# Patient Record
Sex: FEMALE | Race: WHITE | NOT HISPANIC OR LATINO | URBAN - METROPOLITAN AREA
[De-identification: names, ages, dates, MRNs, and addresses within clinical notes are randomized per-mention and may not be internally consistent; named-entity substitution may affect disease eponyms.]

---

## 2017-10-11 ENCOUNTER — INPATIENT (INPATIENT)
Facility: HOSPITAL | Age: 77
LOS: 6 days | Discharge: ANOTHER HOSPITAL | DRG: 682 | End: 2017-10-18
Attending: INTERNAL MEDICINE | Admitting: INTERNAL MEDICINE
Payer: MEDICARE

## 2017-10-11 VITALS
OXYGEN SATURATION: 98 % | DIASTOLIC BLOOD PRESSURE: 71 MMHG | RESPIRATION RATE: 20 BRPM | TEMPERATURE: 98 F | HEART RATE: 90 BPM | HEIGHT: 65 IN | WEIGHT: 160.06 LBS | SYSTOLIC BLOOD PRESSURE: 120 MMHG

## 2017-10-11 DIAGNOSIS — Z95.2 PRESENCE OF PROSTHETIC HEART VALVE: Chronic | ICD-10-CM

## 2017-10-11 DIAGNOSIS — N17.9 ACUTE KIDNEY FAILURE, UNSPECIFIED: ICD-10-CM

## 2017-10-11 DIAGNOSIS — E87.1 HYPO-OSMOLALITY AND HYPONATREMIA: ICD-10-CM

## 2017-10-11 DIAGNOSIS — J44.9 CHRONIC OBSTRUCTIVE PULMONARY DISEASE, UNSPECIFIED: ICD-10-CM

## 2017-10-11 LAB
ACETONE SERPL-MCNC: ABNORMAL
ALBUMIN SERPL ELPH-MCNC: 2.7 G/DL — LOW (ref 3.3–5.2)
ALBUMIN SERPL ELPH-MCNC: 3 G/DL — LOW (ref 3.3–5.2)
ALP SERPL-CCNC: 116 U/L — SIGNIFICANT CHANGE UP (ref 40–120)
ALP SERPL-CCNC: 125 U/L — HIGH (ref 40–120)
ALT FLD-CCNC: 15 U/L — SIGNIFICANT CHANGE UP
ALT FLD-CCNC: 18 U/L — SIGNIFICANT CHANGE UP
ANION GAP SERPL CALC-SCNC: 14 MMOL/L — SIGNIFICANT CHANGE UP (ref 5–17)
ANION GAP SERPL CALC-SCNC: 14 MMOL/L — SIGNIFICANT CHANGE UP (ref 5–17)
ANION GAP SERPL CALC-SCNC: 16 MMOL/L — SIGNIFICANT CHANGE UP (ref 5–17)
ANISOCYTOSIS BLD QL: SLIGHT — SIGNIFICANT CHANGE UP
AST SERPL-CCNC: 16 U/L — SIGNIFICANT CHANGE UP
AST SERPL-CCNC: 23 U/L — SIGNIFICANT CHANGE UP
BILIRUB SERPL-MCNC: 0.3 MG/DL — LOW (ref 0.4–2)
BILIRUB SERPL-MCNC: 0.4 MG/DL — SIGNIFICANT CHANGE UP (ref 0.4–2)
BUN SERPL-MCNC: 51 MG/DL — HIGH (ref 8–20)
CALCIUM SERPL-MCNC: 9 MG/DL — SIGNIFICANT CHANGE UP (ref 8.6–10.2)
CALCIUM SERPL-MCNC: 9.2 MG/DL — SIGNIFICANT CHANGE UP (ref 8.6–10.2)
CALCIUM SERPL-MCNC: 9.5 MG/DL — SIGNIFICANT CHANGE UP (ref 8.6–10.2)
CHLORIDE SERPL-SCNC: 84 MMOL/L — LOW (ref 98–107)
CHLORIDE SERPL-SCNC: 84 MMOL/L — LOW (ref 98–107)
CHLORIDE SERPL-SCNC: 89 MMOL/L — LOW (ref 98–107)
CK SERPL-CCNC: 143 U/L — SIGNIFICANT CHANGE UP (ref 25–170)
CO2 SERPL-SCNC: 20 MMOL/L — LOW (ref 22–29)
CO2 SERPL-SCNC: 21 MMOL/L — LOW (ref 22–29)
CO2 SERPL-SCNC: 21 MMOL/L — LOW (ref 22–29)
CREAT SERPL-MCNC: 1.94 MG/DL — HIGH (ref 0.5–1.3)
CREAT SERPL-MCNC: 1.95 MG/DL — HIGH (ref 0.5–1.3)
CREAT SERPL-MCNC: 2.01 MG/DL — HIGH (ref 0.5–1.3)
ELLIPTOCYTES BLD QL SMEAR: SLIGHT — SIGNIFICANT CHANGE UP
GLUCOSE SERPL-MCNC: 112 MG/DL — SIGNIFICANT CHANGE UP (ref 70–115)
GLUCOSE SERPL-MCNC: 117 MG/DL — HIGH (ref 70–115)
GLUCOSE SERPL-MCNC: 129 MG/DL — HIGH (ref 70–115)
HCT VFR BLD CALC: 28.4 % — LOW (ref 37–47)
HGB BLD-MCNC: 9.9 G/DL — LOW (ref 12–16)
LACTATE BLDV-MCNC: 1.1 MMOL/L — SIGNIFICANT CHANGE UP (ref 0.5–2)
LYMPHOCYTES # BLD AUTO: 3 % — LOW (ref 20–55)
MACROCYTES BLD QL: SLIGHT — SIGNIFICANT CHANGE UP
MCHC RBC-ENTMCNC: 27.3 PG — SIGNIFICANT CHANGE UP (ref 27–31)
MCHC RBC-ENTMCNC: 34.9 G/DL — SIGNIFICANT CHANGE UP (ref 32–36)
MCV RBC AUTO: 78.2 FL — LOW (ref 81–99)
MICROCYTES BLD QL: SLIGHT — SIGNIFICANT CHANGE UP
MONOCYTES NFR BLD AUTO: 7 % — SIGNIFICANT CHANGE UP (ref 3–10)
NEUTROPHILS NFR BLD AUTO: 89 % — HIGH (ref 37–73)
NEUTS BAND # BLD: 1 % — SIGNIFICANT CHANGE UP (ref 0–8)
NRBC # BLD: 1 /100 — HIGH (ref 0–0)
NT-PROBNP SERPL-SCNC: HIGH PG/ML (ref 0–300)
OVALOCYTES BLD QL SMEAR: SLIGHT — SIGNIFICANT CHANGE UP
PLAT MORPH BLD: NORMAL — SIGNIFICANT CHANGE UP
PLATELET # BLD AUTO: 196 K/UL — SIGNIFICANT CHANGE UP (ref 150–400)
POIKILOCYTOSIS BLD QL AUTO: SLIGHT — SIGNIFICANT CHANGE UP
POTASSIUM SERPL-MCNC: 5.6 MMOL/L — HIGH (ref 3.5–5.3)
POTASSIUM SERPL-MCNC: 5.8 MMOL/L — HIGH (ref 3.5–5.3)
POTASSIUM SERPL-MCNC: 6.3 MMOL/L — CRITICAL HIGH (ref 3.5–5.3)
POTASSIUM SERPL-SCNC: 5.6 MMOL/L — HIGH (ref 3.5–5.3)
POTASSIUM SERPL-SCNC: 5.8 MMOL/L — HIGH (ref 3.5–5.3)
POTASSIUM SERPL-SCNC: 6.3 MMOL/L — CRITICAL HIGH (ref 3.5–5.3)
PROT SERPL-MCNC: 6.1 G/DL — LOW (ref 6.6–8.7)
PROT SERPL-MCNC: 6.4 G/DL — LOW (ref 6.6–8.7)
RBC # BLD: 3.63 M/UL — LOW (ref 4.4–5.2)
RBC # FLD: 17.4 % — HIGH (ref 11–15.6)
RBC BLD AUTO: ABNORMAL
SODIUM SERPL-SCNC: 119 MMOL/L — CRITICAL LOW (ref 135–145)
SODIUM SERPL-SCNC: 120 MMOL/L — CRITICAL LOW (ref 135–145)
SODIUM SERPL-SCNC: 124 MMOL/L — LOW (ref 135–145)
T3 SERPL-MCNC: 31 NG/DL — LOW (ref 80–200)
T4 AB SER-ACNC: 4.4 UG/DL — LOW (ref 4.5–12)
TROPONIN T SERPL-MCNC: 0.14 NG/ML — HIGH (ref 0–0.06)
TROPONIN T SERPL-MCNC: 0.15 NG/ML — HIGH (ref 0–0.06)
TSH SERPL-MCNC: 5.86 UIU/ML — HIGH (ref 0.27–4.2)
WBC # BLD: 16.8 K/UL — HIGH (ref 4.8–10.8)
WBC # FLD AUTO: 16.8 K/UL — HIGH (ref 4.8–10.8)

## 2017-10-11 PROCEDURE — 93010 ELECTROCARDIOGRAM REPORT: CPT

## 2017-10-11 PROCEDURE — 99291 CRITICAL CARE FIRST HOUR: CPT

## 2017-10-11 PROCEDURE — 71010: CPT | Mod: 26

## 2017-10-11 RX ORDER — IPRATROPIUM/ALBUTEROL SULFATE 18-103MCG
3 AEROSOL WITH ADAPTER (GRAM) INHALATION EVERY 6 HOURS
Qty: 0 | Refills: 0 | Status: DISCONTINUED | OUTPATIENT
Start: 2017-10-11 | End: 2017-10-18

## 2017-10-11 RX ORDER — SODIUM CHLORIDE 9 MG/ML
1000 INJECTION INTRAMUSCULAR; INTRAVENOUS; SUBCUTANEOUS
Qty: 0 | Refills: 0 | Status: DISCONTINUED | OUTPATIENT
Start: 2017-10-11 | End: 2017-10-12

## 2017-10-11 RX ORDER — ASPIRIN/CALCIUM CARB/MAGNESIUM 324 MG
325 TABLET ORAL DAILY
Qty: 0 | Refills: 0 | Status: DISCONTINUED | OUTPATIENT
Start: 2017-10-11 | End: 2017-10-18

## 2017-10-11 RX ORDER — HEPARIN SODIUM 5000 [USP'U]/ML
5000 INJECTION INTRAVENOUS; SUBCUTANEOUS EVERY 8 HOURS
Qty: 0 | Refills: 0 | Status: DISCONTINUED | OUTPATIENT
Start: 2017-10-11 | End: 2017-10-18

## 2017-10-11 RX ORDER — NITROGLYCERIN 6.5 MG
1 CAPSULE, EXTENDED RELEASE ORAL ONCE
Qty: 0 | Refills: 0 | Status: COMPLETED | OUTPATIENT
Start: 2017-10-11 | End: 2017-10-11

## 2017-10-11 RX ORDER — CARVEDILOL PHOSPHATE 80 MG/1
3.12 CAPSULE, EXTENDED RELEASE ORAL EVERY 12 HOURS
Qty: 0 | Refills: 0 | Status: DISCONTINUED | OUTPATIENT
Start: 2017-10-11 | End: 2017-10-18

## 2017-10-11 RX ORDER — FLUPHENAZINE HYDROCHLORIDE 1 MG/1
5 TABLET, FILM COATED ORAL EVERY 12 HOURS
Qty: 0 | Refills: 0 | Status: DISCONTINUED | OUTPATIENT
Start: 2017-10-11 | End: 2017-10-18

## 2017-10-11 RX ORDER — LEVOTHYROXINE SODIUM 125 MCG
75 TABLET ORAL DAILY
Qty: 0 | Refills: 0 | Status: DISCONTINUED | OUTPATIENT
Start: 2017-10-11 | End: 2017-10-12

## 2017-10-11 RX ORDER — SODIUM CHLORIDE 9 MG/ML
3 INJECTION INTRAMUSCULAR; INTRAVENOUS; SUBCUTANEOUS ONCE
Qty: 0 | Refills: 0 | Status: COMPLETED | OUTPATIENT
Start: 2017-10-11 | End: 2017-10-11

## 2017-10-11 RX ORDER — ASPIRIN/CALCIUM CARB/MAGNESIUM 324 MG
325 TABLET ORAL ONCE
Qty: 0 | Refills: 0 | Status: COMPLETED | OUTPATIENT
Start: 2017-10-11 | End: 2017-10-11

## 2017-10-11 RX ADMIN — Medication 325 MILLIGRAM(S): at 16:47

## 2017-10-11 RX ADMIN — SODIUM CHLORIDE 100 MILLILITER(S): 9 INJECTION INTRAMUSCULAR; INTRAVENOUS; SUBCUTANEOUS at 19:36

## 2017-10-11 RX ADMIN — SODIUM CHLORIDE 3 MILLILITER(S): 9 INJECTION INTRAMUSCULAR; INTRAVENOUS; SUBCUTANEOUS at 16:33

## 2017-10-11 RX ADMIN — Medication 1 INCH(S): at 16:47

## 2017-10-11 NOTE — ED ADULT NURSE REASSESSMENT NOTE - NS ED NURSE REASSESS COMMENT FT1
Per charge nurse Taylor Rodriguez pt to be given spot on A side now and will arrive to room.
Report given and pt endorsed to oncoming RN Mckay White for follow up and continuity of care.

## 2017-10-11 NOTE — ED PROVIDER NOTE - CARE PLAN
Principal Discharge DX:	Hyponatremia  Secondary Diagnosis:	CHF (congestive heart failure)  Secondary Diagnosis:	ARF (acute renal failure)

## 2017-10-11 NOTE — H&P ADULT - NSHPPHYSICALEXAM_GEN_ALL_CORE
PHYSICAL EXAM:   · CONSTITUTIONAL: Well appearing, well nourished, awake, alert, oriented to person, place, time/situation and in no apparent distress.  · ENMT: Airway patent, Nasal mucosa clear. Mouth with dry mucosa. Throat has no vesicles, no oropharyngeal exudates and uvula is midline.  · HEAD: Head atraumatic, normal cephalic shape.  · HEAD: Head is atraumatic. Head shape is symmetrical.  · EYES: Clear bilaterally, pupils equal, round and reactive to light.  · CARDIAC: Normal rate, regular rhythm.  Heart sounds S1, S2.  No murmurs, rubs or gallops.  · RESPIRATORY: Breath sounds clear and equal bilaterally.  · GASTROINTESTINAL: Abdomen soft, non-tender, no guarding.  · MUSCULOSKELETAL: Spine appears normal, range of motion is not limited, no muscle or joint tenderness  · NEUROLOGICAL: Alert and oriented, no focal deficits, no motor or sensory deficits.  · SKIN: Skin normal color for race, warm, dry and intact. No evidence of rash.  · PSYCHIATRIC: Alert and oriented to person, place, time/situation. normal mood and affect. no apparent risk to self or others.  · HEME LYMPH: No adenopathy or splenomegaly. No cervical or inguinal lymphadenopathy.

## 2017-10-11 NOTE — ED PROVIDER NOTE - OBJECTIVE STATEMENT
The patient is a 77 year female presents to ED complaining of chest pain since last night. Described as pressure with SOB and radiation to L arm

## 2017-10-11 NOTE — ED ADULT NURSE NOTE - OBJECTIVE STATEMENT
77 year old female in no acute distress, alert and oriented x 4 but poor historian, c/o midsternal chest pain radiating to left arm onset overnight. Pt is on cardiac and O2 monitors, fall precautions in place, will monitor. Pt does not know how she got large bruise to left thigh and does not know her history or medications, vss.

## 2017-10-11 NOTE — H&P ADULT - NSHPREVIEWOFSYSTEMS_GEN_ALL_CORE
Review of Systems:  · CONSTITUTIONAL: no fever and no chills.  · EYES: no discharge, no irritation, no pain, no redness, and no visual changes.  · ENMT: Ears: no ear pain and no hearing problems.Nose: no nasal congestion and no nasal drainage.Mouth/Throat: no dysphagia, no hoarseness and no throat pain.Neck: no lumps, no pain, no stiffness and no swollen glands.  · CARDIOVASCULAR: - - -  · Cardiovascular [+]: CHEST PAIN  · RESPIRATORY: no chest pain, no cough, and no shortness of breath.  · GASTROINTESTINAL: no abdominal pain, no bloating, no constipation, no diarrhea, no nausea and no vomiting.  · MUSCULOSKELETAL: no back pain, no gout, no musculoskeletal pain, no neck pain, and no weakness.  · SKIN: no abrasions, no jaundice, no lesions, no pruritis, and no rashes.  · NEURO: no loss of consciousness, no gait abnormality, no headache, no sensory deficits, and no weakness.  · PSYCHIATRIC: no known mental health issues.  · ENDOCRINE: no diabetes and no thyroid trouble.  · HEME/LYMPH: no anemia, no easy bruising, no jaundice, no swollen lymph nodes.  · ALLERGIC/IMMUNOLOGIC: no dermatitis, no environmental allergies, no food allergies, no immunosuppressive disorder, and no pruritus.

## 2017-10-11 NOTE — H&P ADULT - PROBLEM SELECTOR PLAN 1
serial labs  normal saline infusion   monitor neuro status.   keep K>4, mag >2.0  avoid any meds that can potentiate drop in NA

## 2017-10-11 NOTE — H&P ADULT - HISTORY OF PRESENT ILLNESS
The patient is a 77 year female presents to ED complaining of chest pain since last night. Described as pressure with SOB and radiation to L arm.

## 2017-10-11 NOTE — H&P ADULT - ATTENDING COMMENTS
Pt admitted overnight by [facility] NP as supervised by eICU physician. I have independently examined this patient this morning and agree with above assessment and plan. Full progress note to follow.

## 2017-10-11 NOTE — ED PROVIDER NOTE - MEDICAL DECISION MAKING DETAILS
The patient appears ill and dehydrated with sodium of 119.  Need to admit to MICU for further managment

## 2017-10-11 NOTE — H&P ADULT - ASSESSMENT
The patient is a 77 year female presents to ED complaining of chest pain since last night. Described as pressure with SOB and radiation to L arm. Pt found to have hyponatremia and CARY.

## 2017-10-12 DIAGNOSIS — D64.9 ANEMIA, UNSPECIFIED: ICD-10-CM

## 2017-10-12 DIAGNOSIS — E03.9 HYPOTHYROIDISM, UNSPECIFIED: ICD-10-CM

## 2017-10-12 DIAGNOSIS — E83.42 HYPOMAGNESEMIA: ICD-10-CM

## 2017-10-12 DIAGNOSIS — I50.32 CHRONIC DIASTOLIC (CONGESTIVE) HEART FAILURE: ICD-10-CM

## 2017-10-12 DIAGNOSIS — F25.9 SCHIZOAFFECTIVE DISORDER, UNSPECIFIED: ICD-10-CM

## 2017-10-12 DIAGNOSIS — F05 DELIRIUM DUE TO KNOWN PHYSIOLOGICAL CONDITION: ICD-10-CM

## 2017-10-12 LAB
ANION GAP SERPL CALC-SCNC: 14 MMOL/L — SIGNIFICANT CHANGE UP (ref 5–17)
APPEARANCE UR: CLEAR — SIGNIFICANT CHANGE UP
APPEARANCE UR: CLEAR — SIGNIFICANT CHANGE UP
BILIRUB UR-MCNC: NEGATIVE — SIGNIFICANT CHANGE UP
BILIRUB UR-MCNC: NEGATIVE — SIGNIFICANT CHANGE UP
BUN SERPL-MCNC: 48 MG/DL — HIGH (ref 8–20)
CALCIUM SERPL-MCNC: 8.8 MG/DL — SIGNIFICANT CHANGE UP (ref 8.6–10.2)
CHLORIDE SERPL-SCNC: 91 MMOL/L — LOW (ref 98–107)
CO2 SERPL-SCNC: 21 MMOL/L — LOW (ref 22–29)
COLOR SPEC: YELLOW — SIGNIFICANT CHANGE UP
COLOR SPEC: YELLOW — SIGNIFICANT CHANGE UP
CREAT SERPL-MCNC: 1.8 MG/DL — HIGH (ref 0.5–1.3)
DIFF PNL FLD: ABNORMAL
DIFF PNL FLD: ABNORMAL
EPI CELLS # UR: SIGNIFICANT CHANGE UP
EPI CELLS # UR: SIGNIFICANT CHANGE UP
GLUCOSE SERPL-MCNC: 99 MG/DL — SIGNIFICANT CHANGE UP (ref 70–115)
GLUCOSE UR QL: NEGATIVE MG/DL — SIGNIFICANT CHANGE UP
GLUCOSE UR QL: NEGATIVE MG/DL — SIGNIFICANT CHANGE UP
HCT VFR BLD CALC: 25.5 % — LOW (ref 37–47)
HGB BLD-MCNC: 8.7 G/DL — LOW (ref 12–16)
KETONES UR-MCNC: NEGATIVE — SIGNIFICANT CHANGE UP
KETONES UR-MCNC: NEGATIVE — SIGNIFICANT CHANGE UP
LEUKOCYTE ESTERASE UR-ACNC: ABNORMAL
LEUKOCYTE ESTERASE UR-ACNC: ABNORMAL
MAGNESIUM SERPL-MCNC: 1.4 MG/DL — LOW (ref 1.6–2.6)
MCHC RBC-ENTMCNC: 26.9 PG — LOW (ref 27–31)
MCHC RBC-ENTMCNC: 34.1 G/DL — SIGNIFICANT CHANGE UP (ref 32–36)
MCV RBC AUTO: 78.9 FL — LOW (ref 81–99)
NITRITE UR-MCNC: NEGATIVE — SIGNIFICANT CHANGE UP
NITRITE UR-MCNC: NEGATIVE — SIGNIFICANT CHANGE UP
OSMOLALITY UR: 323 MOSM/KG — SIGNIFICANT CHANGE UP (ref 300–1000)
PH UR: 6 — SIGNIFICANT CHANGE UP (ref 5–8)
PH UR: 6 — SIGNIFICANT CHANGE UP (ref 5–8)
PHOSPHATE SERPL-MCNC: 3.4 MG/DL — SIGNIFICANT CHANGE UP (ref 2.4–4.7)
PLATELET # BLD AUTO: 162 K/UL — SIGNIFICANT CHANGE UP (ref 150–400)
POTASSIUM SERPL-MCNC: 5.3 MMOL/L — SIGNIFICANT CHANGE UP (ref 3.5–5.3)
POTASSIUM SERPL-SCNC: 5.3 MMOL/L — SIGNIFICANT CHANGE UP (ref 3.5–5.3)
PROT UR-MCNC: 30 MG/DL
PROT UR-MCNC: 30 MG/DL
RBC # BLD: 3.23 M/UL — LOW (ref 4.4–5.2)
RBC # FLD: 17.6 % — HIGH (ref 11–15.6)
RBC CASTS # UR COMP ASSIST: ABNORMAL /HPF (ref 0–4)
RBC CASTS # UR COMP ASSIST: SIGNIFICANT CHANGE UP /HPF (ref 0–4)
SODIUM SERPL-SCNC: 126 MMOL/L — LOW (ref 135–145)
SODIUM UR-SCNC: <30 MMOL/L — SIGNIFICANT CHANGE UP
SP GR SPEC: 1.01 — SIGNIFICANT CHANGE UP (ref 1.01–1.02)
SP GR SPEC: 1.01 — SIGNIFICANT CHANGE UP (ref 1.01–1.02)
URATE SERPL-MCNC: 7.5 MG/DL — HIGH (ref 2.4–5.7)
UROBILINOGEN FLD QL: NEGATIVE MG/DL — SIGNIFICANT CHANGE UP
UROBILINOGEN FLD QL: NEGATIVE MG/DL — SIGNIFICANT CHANGE UP
WBC # BLD: 11.7 K/UL — HIGH (ref 4.8–10.8)
WBC # FLD AUTO: 11.7 K/UL — HIGH (ref 4.8–10.8)
WBC UR QL: ABNORMAL
WBC UR QL: SIGNIFICANT CHANGE UP

## 2017-10-12 PROCEDURE — 76775 US EXAM ABDO BACK WALL LIM: CPT | Mod: 26

## 2017-10-12 PROCEDURE — 70450 CT HEAD/BRAIN W/O DYE: CPT | Mod: 26

## 2017-10-12 PROCEDURE — 99233 SBSQ HOSP IP/OBS HIGH 50: CPT

## 2017-10-12 PROCEDURE — 99223 1ST HOSP IP/OBS HIGH 75: CPT

## 2017-10-12 PROCEDURE — 93306 TTE W/DOPPLER COMPLETE: CPT | Mod: 26

## 2017-10-12 RX ORDER — FLUPHENAZINE HYDROCHLORIDE 1 MG/1
5 TABLET, FILM COATED ORAL
Qty: 0 | Refills: 0 | COMMUNITY

## 2017-10-12 RX ORDER — LEVOTHYROXINE SODIUM 125 MCG
88 TABLET ORAL DAILY
Qty: 0 | Refills: 0 | Status: DISCONTINUED | OUTPATIENT
Start: 2017-10-12 | End: 2017-10-18

## 2017-10-12 RX ORDER — SODIUM CHLORIDE 9 MG/ML
1000 INJECTION INTRAMUSCULAR; INTRAVENOUS; SUBCUTANEOUS
Qty: 0 | Refills: 0 | Status: DISCONTINUED | OUTPATIENT
Start: 2017-10-12 | End: 2017-10-15

## 2017-10-12 RX ORDER — CARVEDILOL PHOSPHATE 80 MG/1
1 CAPSULE, EXTENDED RELEASE ORAL
Qty: 0 | Refills: 0 | COMMUNITY

## 2017-10-12 RX ORDER — SODIUM CHLORIDE 9 MG/ML
1 INJECTION INTRAMUSCULAR; INTRAVENOUS; SUBCUTANEOUS THREE TIMES A DAY
Qty: 0 | Refills: 0 | Status: DISCONTINUED | OUTPATIENT
Start: 2017-10-12 | End: 2017-10-16

## 2017-10-12 RX ORDER — METFORMIN HYDROCHLORIDE 850 MG/1
1 TABLET ORAL
Qty: 0 | Refills: 0 | COMMUNITY

## 2017-10-12 RX ORDER — OMEPRAZOLE 10 MG/1
1 CAPSULE, DELAYED RELEASE ORAL
Qty: 0 | Refills: 0 | COMMUNITY

## 2017-10-12 RX ORDER — LISINOPRIL 2.5 MG/1
20 TABLET ORAL DAILY
Qty: 0 | Refills: 0 | Status: DISCONTINUED | OUTPATIENT
Start: 2017-10-12 | End: 2017-10-13

## 2017-10-12 RX ORDER — MAGNESIUM SULFATE 500 MG/ML
2 VIAL (ML) INJECTION ONCE
Qty: 0 | Refills: 0 | Status: COMPLETED | OUTPATIENT
Start: 2017-10-12 | End: 2017-10-12

## 2017-10-12 RX ORDER — RAMIPRIL 5 MG
5 CAPSULE ORAL
Qty: 0 | Refills: 0 | COMMUNITY

## 2017-10-12 RX ADMIN — CARVEDILOL PHOSPHATE 3.12 MILLIGRAM(S): 80 CAPSULE, EXTENDED RELEASE ORAL at 18:25

## 2017-10-12 RX ADMIN — SODIUM CHLORIDE 100 MILLILITER(S): 9 INJECTION INTRAMUSCULAR; INTRAVENOUS; SUBCUTANEOUS at 05:49

## 2017-10-12 RX ADMIN — LISINOPRIL 20 MILLIGRAM(S): 2.5 TABLET ORAL at 21:33

## 2017-10-12 RX ADMIN — Medication 1 INCH(S): at 04:00

## 2017-10-12 RX ADMIN — Medication 88 MICROGRAM(S): at 21:33

## 2017-10-12 RX ADMIN — HEPARIN SODIUM 5000 UNIT(S): 5000 INJECTION INTRAVENOUS; SUBCUTANEOUS at 14:58

## 2017-10-12 RX ADMIN — Medication 75 MICROGRAM(S): at 10:26

## 2017-10-12 RX ADMIN — SODIUM CHLORIDE 50 MILLILITER(S): 9 INJECTION INTRAMUSCULAR; INTRAVENOUS; SUBCUTANEOUS at 21:30

## 2017-10-12 RX ADMIN — HEPARIN SODIUM 5000 UNIT(S): 5000 INJECTION INTRAVENOUS; SUBCUTANEOUS at 00:16

## 2017-10-12 RX ADMIN — FLUPHENAZINE HYDROCHLORIDE 5 MILLIGRAM(S): 1 TABLET, FILM COATED ORAL at 10:27

## 2017-10-12 RX ADMIN — HEPARIN SODIUM 5000 UNIT(S): 5000 INJECTION INTRAVENOUS; SUBCUTANEOUS at 21:33

## 2017-10-12 RX ADMIN — Medication 5 MILLIGRAM(S): at 10:26

## 2017-10-12 RX ADMIN — SODIUM CHLORIDE 1 GRAM(S): 9 INJECTION INTRAMUSCULAR; INTRAVENOUS; SUBCUTANEOUS at 21:33

## 2017-10-12 RX ADMIN — Medication 50 GRAM(S): at 09:39

## 2017-10-12 RX ADMIN — HEPARIN SODIUM 5000 UNIT(S): 5000 INJECTION INTRAVENOUS; SUBCUTANEOUS at 05:49

## 2017-10-12 RX ADMIN — CARVEDILOL PHOSPHATE 3.12 MILLIGRAM(S): 80 CAPSULE, EXTENDED RELEASE ORAL at 10:26

## 2017-10-12 RX ADMIN — FLUPHENAZINE HYDROCHLORIDE 5 MILLIGRAM(S): 1 TABLET, FILM COATED ORAL at 18:25

## 2017-10-12 RX ADMIN — SODIUM CHLORIDE 50 MILLILITER(S): 9 INJECTION INTRAMUSCULAR; INTRAVENOUS; SUBCUTANEOUS at 10:27

## 2017-10-12 RX ADMIN — Medication 325 MILLIGRAM(S): at 14:58

## 2017-10-12 NOTE — CONSULT NOTE ADULT - ASSESSMENT
CARY - Baseline renal function ??  NIDDM , HTN , COPD   Clinical volume contraction   Hold Ramipril and Metformin ( lactate OK )   IVF   Renal sono in am   ECHO ordered   UA, Anastacio  Trend labs     Hyponatremia   Improved   Baseline affect ?? with Schizoaffective   Continue IV NS   Follow serial chemistries  SSRI held   Cortisol am     Hyperkalemia - ACE held , repeat BMP CARY - Baseline renal function ??  NIDDM , HTN , COPD   Clinical volume contraction   Hold Ramipril and Metformin ( lactate OK )   IVF   Renal sono in am   ECHO ordered   UA, Anastacio  Trend labs     Hyponatremia   Improved   Baseline affect ?? with Schizoaffective   Continue IV NS   Follow serial chemistries  SSRI held   Anastacio + Uosm  Cortisol + uric acid  am     Hyperkalemia - ACE held , repeat BMP

## 2017-10-12 NOTE — SWALLOW BEDSIDE ASSESSMENT ADULT - ASR SWALLOW ASPIRATION MONITOR
cough/gurgly voice/upper respiratory infection/change of breathing pattern/position upright (90Y)/fever/throat clearing/oral hygiene/pneumonia

## 2017-10-12 NOTE — BEHAVIORAL HEALTH ASSESSMENT NOTE - SUMMARY
Patient is a 76 y/o female with PPH of schizophrenia, in outpatient care at [facility] with Dr Gates x 10 years, multiple remote past inpatient psychiatric hospitalizations, admitted to [facility] on 10/10 from [facility] c/o chest pain and found to have hyponatremia. Patient admitted to MICU, psychiatry consulted requested schizophrenia.  Patient is currently arousable to verbal stimuli, oriented x 2 but then fall back to sleep. RN reports patient was alert and oriented for brief period this am but then became lethargic after receiving 9am medications.  It is unclear when her Clozapine was discontinued and she has missed at least 3 doses.   When awake patient was cooperative and in good behavioral control. Her cognition is waxing and waning  Recommend Patient is a 78 y/o female with PPH of schizophrenia, in outpatient care at [facility] with Dr Gates x 10 years, multiple remote past inpatient psychiatric hospitalizations, admitted to [facility] on 10/10 from [facility] c/o chest pain and found to have hyponatremia. Patient admitted to MICU, psychiatry consulted requested schizophrenia.  Patient is currently arousable to verbal stimuli, oriented x 2 but then fall back to sleep. RN reports patient was alert and oriented for brief period this am but then became lethargic after receiving 9am medications.  It is unclear when her Clozapine was discontinued and she has missed at least 3 doses and when restarted would need to restart a starting dose and titrate slowly.    When awake patient was cooperative and in good behavioral control. Her cognition is waxing and waning in the context of UTI and hyponatremia. Case discussed with Dr Lamb.   Recommend  continue Fluphenazine 5mg q12h  discontinue Buspar  agree with stopping Paxil.  Psychiatry will follow.

## 2017-10-12 NOTE — BEHAVIORAL HEALTH ASSESSMENT NOTE - HPI (INCLUDE ILLNESS QUALITY, SEVERITY, DURATION, TIMING, CONTEXT, MODIFYING FACTORS, ASSOCIATED SIGNS AND SYMPTOMS)
Patient is a 78 y/o female with PPH of schizophrenia, in outpatient care at [facility] with Dr Gates x 10 years, multiple remote past inpatient psychiatric hospitalizations, admitted to [facility] on 10/10 from [facility] c/o chest pain and found to have hyponatremia. Patient admitted to MICU, psychiatry consulted requested schizophrenia.  Patient lethargic is not able to provide HPI. Received personal collateral from daughter-in-law, Fe Ford, who reports patient has been living at [facility] x 15 years. On 9/19 patient underwent cardiac valve replacement at [facility]. She returned to [facility] and was then admitted to [facility] for hyponatremia and then discharged to [facility] for [facility] on 10/9/2017. Yesterday patient c/o chest pain and was sent to [facility]. Lupe denies patient has h/o substance abuse, violence or suicide attempts. The last inpatient psychiatric hospitalization was over 20 years ago.  Received professional collateral from Dr Gates at [facility] who has been treating patient with Clozapine 125 mg daily, Paxil 30 mg daily and Fluphenazine 5 mg q12h for many years. Patient has been stable on these medications with no breakthrough psychosis. In the past patient received ACT Team supervision. While at [place] patient was started on Buspar for anxiety and on 10/4/2017, while at [facility], patient sodium was 111.   Received additional collateral from LINDA Quinones at [facility] who reports patient was admitted to [facility] from [facility] on 10/9/2017. Clozapine was discontiued at [facility] and was not restarted at [facility]. It is unclear when patient received last dose of Clozapine.

## 2017-10-12 NOTE — SWALLOW BEDSIDE ASSESSMENT ADULT - ORAL PREPARATORY PHASE
OUTPATIENT PROGRESS NOTE    CC:  Left arm pain, ED follow up    HPI  The patient is a 74 year old female with a PMHx significant for Type 2 Diabetes, HTN, obesity who comes in today for follow up of a recent ED visit.  She was seen in the ED on 7/27/17 with a cough and pain in her ribs and back, her DDimer was just below normal and a V/Q scan was done which showed low probability for PE.  Patient was also having a productive cough at the time.  She feels that these symptoms have resolved, but she now has pain in her left shoulder with a shooting pain and tingling that goes down her left arm.  She believes that this is worse if the arm is cold.  She also feels that her fingers tingle when they get cold.  The pain can sometimes wake her up at night.  She has been taking Aleve 4x per day to deal with pain.  Has tried heat without out much relief.  Previously had pain in right shoulder and MRI showing partial rotator cuff tear, Ortho did a cortisone injection which worked well for her.       Patient also complains of constipation which is not a new complaint for her.  She reports small BM's, then will have a large BM of diarrhea.  She has been taking miralax and senokot inconsistently.  She reports her sister mostly cooks, and in general it is healthy, but she will eat \"whatever is front of her.\"         ROS  GEN:  Denies headache, fever, chills, fatigue and weight loss/gain  HEENT: Denies change in vision, hearing, dysphagia  CV: Denies chest pain, palpitations, dyspnea on exertion, orthopnea, lower extremity edema  RESP: Denies shortness of breath, hemoptysis  +wheezing, +cough  GI: Denies nausea, vomiting, acid reflux, melena, hematochezia, abdominal pain and change in appetite   +constipation  : Denies discharge, dysuria, frequency, nocturia, hematuria and incontinence  MSKT: See HPI  SKIN: Denies rash, pruritis and lesions  NEURO: see HPI  PSYCH: Denies anxiety, depression, chemical dependency/abuse     Patient  Active Problem List    Diagnosis Date Noted   • Infection of nailbed of toe of left foot 07/30/2015     Priority: Low   • Acute UTI 05/14/2015     Priority: Low   • Dysuria 05/12/2015     Priority: Low   • Diabetic neuropathy, type II diabetes mellitus (CMS/HCC) 05/12/2015     Priority: Low   • Tinea pedis 05/12/2015     Priority: Low   • Urge and stress incontinence 05/12/2015     Priority: Low   • Morbid obesity with BMI of 40.0-44.9, adult (CMS/HCC) 11/23/2016   • Hypersomnia with sleep apnea 12/08/2014   • Esophageal reflux 07/09/2014   • Snoring disorder 07/09/2014     Possible SAMANTHA - sleep study ordered     • Asthma 06/25/2014   • Persistent cough 06/25/2014   • Obesity, Class III, BMI 40-49.9 (morbid obesity) (CMS/HCC) 06/25/2014   • DM (diabetes mellitus) type II uncontrolled with eye manifestation (CMS/HCC) 06/24/2014   • HTN (hypertension) 06/24/2014   • Neuralgia, post-herpetic 06/24/2014   • Vertigo 06/24/2014   • 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   • Incontinence Supply Disposable (ADHERES INCONTINENCE PAD) Misc patient uses pads at least three times a day, and at night.  Use as needed.  1 case with 4 bags per case.   • Misc. Devices (WHEELCHAIR) [facility] [employer] wheelchair.  E11.40 Diabetic Neuropathy   • metformin (GLUCOPHAGE-XR) 500 MG 24 hr tablet Take 2 tablets by mouth daily (with breakfast). Dose: 2 tabs (=1,000mg)   • albuterol (PROAIR HFA) 108 (90 Base) MCG/ACT inhaler Inhale 2 puffs into the lungs every 4 hours as needed for Shortness of Breath or Wheezing.   •  LANTUS SOLOSTAR 100 UNIT/ML pen-injector INJECT 30 UNITS IN THE MORNING AND 32 UNITS AT NIGHT. WILL TITRATE NIGHT DOSE ACCORDING TO MORNING FASTING BLOOD SUGARS   • simvastatin (ZOCOR) 20 MG tablet TAKE 1 TABLET BY MOUTH AT BEDTIME   • liraglutide (VICTOZA) 18 MG/3ML pen-injector Inject 0.2 mLs into the skin daily.   • blood glucose test strips (ONE TOUCH ULTRA TEST) Test blood sugar 3 times daily as directed. Diagnosis:  E11.9 Meter: one touch ultra   • Bimatoprost (LUMIGAN) 0.01 % Solution 1 drop Every evening.   • senna (SENOKOT) 8.6 MG tablet Take 1 tablet by mouth daily. (Patient taking differently: Take 1 tablet by mouth daily as needed for Constipation. )   • ketoconazole (NIZORAL) 2 % cream Apply to itchy and scaly feet twice daily.   • fluticasone (FLONASE) 50 MCG/ACT nasal spray Spray 1 spray in each nostril daily. (Patient taking differently: Spray 1 spray in each nostril daily as needed. )   • Nystatin Powder Apply between toes as needed on bilateral feet   • aspirin 81 MG tablet Take 81 mg by mouth daily.   • polyethylene glycol (MIRALAX) powder TAKE 17 GRAM BY MOUTH EVERY DAY   • methylPREDNISolone (MEDROL DOSEPAK) 4 MG tablet follow package directions   • azithromycin (ZITHROMAX Z-ANTON) 250 MG tablet Take 2 tablets (500 mg) by mouth x 1 day then 1 tablet (250 mg) by mouth daily x 4 days.   • Varicella-Zoster Immune Glob 125 UNIT/1.2ML Solution Inject 1 ampule into the muscle 1 time.   • polyvinyl alcohol-povidone (REFRESH) 1.4-0.6 % ophthalmic solution Apply 1 drop to eye as needed.   • guaiFENesin (MUCINEX) 600 MG 12 hr tablet Take 1 tablet by mouth 2 times daily. (Patient taking differently: Take 600 mg by mouth 2 times daily as needed for Congestion. )     No current facility-administered medications for this visit.        Allergies as of 08/08/2017 - Reviewed 08/08/2017   Allergen Reaction Noted   • Avastin [bevacizumab] HIVES    • Ceftriaxone RASH 09/25/2013   • Dye [contrast media] HIVES  08/24/2015   • Lisinopril Cough 08/12/2014   • Pantoprazole RASH and Dermatitis 08/12/2014       PHYSICAL EXAM  Vitals: Blood pressure 122/66, pulse 105, height 5' 3\" (1.6 m), weight 104.6 kg, SpO2 95 %.  General:  NAD, over nourished well groomed female  HEENT:  No scleral icterus or conjunctival pallor,  Left eye lid lag, left pupil not fully reactive to light, right normal.    Cardiovascular:  RRR, no m/r/g, S1/S2 present, no JVD, no B/L LE edema  Respiratory:  CTAB, no retractions or increased work of breathing  Gastrointestinal:  +BS, soft, NT/ND, obese  Musculoskeletal:   No swelling of finger, hand, elbow or shoulders.  Right shoulder normal ROM, strength, non tender.  Left shoulder tender to palpation.  Able to lift left shoulder to about 45 degrees above parallel with the floor.  Unable to place arm behind back 2/2 to pain.  Can place arm behind head but requires assistance of right arm.  Pain on internal and external rotation of shoulder.  ROM and strength normal below the elbow.    Skin:  No rashes/lesions/ecchymoses/jaundice.  Neurologic: A/O x3. No gross motor or sensory deficits. No facial droop or dysarthria.  Psychiatric:  Appropriate mood/affect.    ASSESSMENT/PLAN  (R05) Productive cough  Comment: Symptoms have resolved, continues to have some pain over left scapula, not sure if related to recent cough or shoulder pain.  ER CXR, V/Q scan, and labs reviewed.    Plan: No additional work up needed.      (M25.512) Acute pain of left shoulder  Comment: Patient complaints of sharp shooting pain and tingling.  Exam shows with intern/external rotation, tenderness, and limited ROM.    Plan:   Make and appointment with Ortho - Dr. Menchaca   Will let Ortho decide on further imaging and intervention - appreciate their help    (K59.00) Constipation, unspecified constipation type  Comment: Chronic issue, has not been using meds consistently.    Plan:  Take senokot daily   Take Miralax Daily - titrate amount once  having regular BMs   Increase prune and raisin intake    (I10) Essential hypertension  Comment: Patient needs refill of Losartan-HCTZ.  BP today 122/66.  Plan: Continue Losartan-HCTZ 50-12.5mg    (E66.01,  Z68.41) Morbid obesity with BMI of 40.0-44.9, adult (CMS/[facility])  Comment: Patient reports sister cooks \"healthy\", but that she overeats at times.  She has been getting very limited exercise and is mostly sedentary.  She was not open joining a gym.  She will attempt to cut down on calorie intake and track her calories.    Plan:  Calorie tracking   Encouraged to increase exercise, even if just a little at this point.      Patient seen, examined, and discussed with my Attending Dr. Joel Warner MD.    Parveen Escobar MD  Internal Medicine PGY-3  [phone]           Within functional limits Decreased mastication ability

## 2017-10-12 NOTE — PROGRESS NOTE ADULT - PROBLEM SELECTOR PROBLEM 5
Chronic obstructive pulmonary disease, unspecified COPD type Chronic diastolic congestive heart failure

## 2017-10-12 NOTE — PROGRESS NOTE ADULT - PROBLEM SELECTOR PLAN 4
continue fluphenazine, buspar  holding SSRI as it may be causing hypoNa  psych eval called as pt was previously on clozaril

## 2017-10-12 NOTE — BEHAVIORAL HEALTH ASSESSMENT NOTE - NSBHCHARTREVIEWVS_PSY_A_CORE FT
Vital Signs Last 24 Hrs  T(C): 37.4 (12 Oct 2017 12:10), Max: 37.4 (12 Oct 2017 12:10)  T(F): 99.4 (12 Oct 2017 12:10), Max: 99.4 (12 Oct 2017 12:10)  HR: 88 (12 Oct 2017 10:00) (83 - 91)  BP: 126/59 (12 Oct 2017 10:00) (104/53 - 140/63)  BP(mean): 85 (12 Oct 2017 10:00) (71 - 90)  RR: 18 (12 Oct 2017 10:00) (14 - 22)  SpO2: 98% (12 Oct 2017 10:00) (95% - 100%)

## 2017-10-12 NOTE — PROGRESS NOTE ADULT - ASSESSMENT
76 yo female with h/p CHF presented to the ED with c/o chest pain found to have hyponatremia and ARF , treated in ICU with fluid seen by nephrology sodium level improving, she is lethargic

## 2017-10-12 NOTE — PROGRESS NOTE ADULT - SUBJECTIVE AND OBJECTIVE BOX
Patient is a 77y old  Female who presents with a chief complaint of chest pain was found to have hyponatremia, admitted to ICU she is weak sleepy , awake to verbal stimuli denies any chest pain   Chart reviewed , no overnight events     Allergies    No Known Allergies    Intolerances        REVIEW OF SYSTEMS:  as above limited exam     Vital Signs Last 24 Hrs  T(C): 37.4 (12 Oct 2017 12:10), Max: 37.4 (12 Oct 2017 12:10)  T(F): 99.4 (12 Oct 2017 12:10), Max: 99.4 (12 Oct 2017 12:10)  HR: 81 (12 Oct 2017 13:00) (75 - 91)  BP: 130/61 (12 Oct 2017 13:00) (104/53 - 140/63)  BP(mean): 88 (12 Oct 2017 13:00) (71 - 90)  RR: 16 (12 Oct 2017 13:00) (13 - 22)  SpO2: 97% (12 Oct 2017 13:00) (95% - 100%)    PHYSICAL EXAM:    GENERAL: NAD, well-groomed, well-developed  NECK: Supple, No JVD, Normal thyroid  NERVOUS SYSTEM:  Alert & Oriented X3, generalized weakness , no motor deficits   CHEST/LUNG: Clear to auscultation bilaterally ;No rales, rhonchi  HEART: Regular rate and rhythm; No murmurs, rubs, or gallops  ABDOMEN: Soft, Nontender, Nondistended; Bowel sounds present  EXTREMITIES:  2+ Peripheral Pulses, No clubbing, cyanosis, or edema  SKIN: No rashes or lesions      LABS:                        8.7    11.7  )-----------( 162      ( 12 Oct 2017 06:12 )             25.5     10-12    126<L>  |  91<L>  |  48.0<H>  ----------------------------<  99  5.3   |  21.0<L>  |  1.80<H>    Ca    8.8      12 Oct 2017 06:12  Phos  3.4     10-12  Mg     1.4     10-12    TPro  6.1<L>  /  Alb  2.7<L>  /  TBili  0.3<L>  /  DBili  x   /  AST  16  /  ALT  15  /  AlkPhos  116  10-11      Urinalysis Basic - ( 12 Oct 2017 06:11 )    Color: Yellow / Appearance: Clear / S.010 / pH: x  Gluc: x / Ketone: Negative  / Bili: Negative / Urobili: Negative mg/dL   Blood: x / Protein: 30 mg/dL / Nitrite: Negative   Leuk Esterase: Moderate / RBC: 0-2 /HPF / WBC 6-10   Sq Epi: x / Non Sq Epi: Occasional / Bacteria: x        RADIOLOGY & ADDITIONAL TESTS: Called hospitalist service to transfer pt under medicine service by ICU     Patient is a 77y old  Female who presents with a chief complaint of chest pain was found to have hyponatremia, admitted to ICU   she is weak sleepy at present awake to verbal stimuli denies any chest pain   Chart reviewed , no overnight events   renal consult appreciated     Allergies    No Known Allergies    Intolerances        REVIEW OF SYSTEMS:  as above limited exam     Vital Signs Last 24 Hrs  T(C): 37.4 (12 Oct 2017 12:10), Max: 37.4 (12 Oct 2017 12:10)  T(F): 99.4 (12 Oct 2017 12:10), Max: 99.4 (12 Oct 2017 12:10)  HR: 81 (12 Oct 2017 13:00) (75 - 91)  BP: 130/61 (12 Oct 2017 13:00) (104/53 - 140/63)  BP(mean): 88 (12 Oct 2017 13:00) (71 - 90)  RR: 16 (12 Oct 2017 13:00) (13 - 22)  SpO2: 97% (12 Oct 2017 13:00) (95% - 100%)    PHYSICAL EXAM:    GENERAL: NAD, well-groomed, well-developed  NECK: Supple, No JVD, Normal thyroid  NERVOUS SYSTEM:  Alert & Oriented X3, generalized weakness , no motor deficits   CHEST/LUNG: Clear to auscultation bilaterally ;No rales, rhonchi  HEART: Regular rate and rhythm; No murmurs, rubs, or gallops  ABDOMEN: Soft, Nontender, Nondistended; Bowel sounds present  EXTREMITIES:  2+ Peripheral Pulses, No clubbing, cyanosis, or edema  SKIN: No rashes or lesions      LABS:                        8.7    11.7  )-----------( 162      ( 12 Oct 2017 06:12 )             25.5     10-12    126<L>  |  91<L>  |  48.0<H>  ----------------------------<  99  5.3   |  21.0<L>  |  1.80<H>    Ca    8.8      12 Oct 2017 06:12  Phos  3.4     10-12  Mg     1.4     10-12    TPro  6.1<L>  /  Alb  2.7<L>  /  TBili  0.3<L>  /  DBili  x   /  AST  16  /  ALT  15  /  AlkPhos  116  10-11      Urinalysis Basic - ( 12 Oct 2017 06:11 )    Color: Yellow / Appearance: Clear / S.010 / pH: x  Gluc: x / Ketone: Negative  / Bili: Negative / Urobili: Negative mg/dL   Blood: x / Protein: 30 mg/dL / Nitrite: Negative   Leuk Esterase: Moderate / RBC: 0-2 /HPF / WBC 6-10   Sq Epi: x / Non Sq Epi: Occasional / Bacteria: x        RADIOLOGY & ADDITIONAL TESTS:

## 2017-10-12 NOTE — PROGRESS NOTE ADULT - SUBJECTIVE AND OBJECTIVE BOX
Patient is a 77y old  Female who presents with a chief complaint of chest pain   hyponatermia (11 Oct 2017 21:21)      BRIEF HOSPITAL COURSE: The patient is a 77 year female presents to ED complaining of chest pain since last night. Described as pressure with SOB and radiation to L arm.    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, without long-term current use of insulin  Other depression  Chronic diastolic congestive heart failure  Chronic obstructive pulmonary disease, unspecified COPD type  Aortic valve replaced: TAVR      Review of Systems:  pt denies pain or complaint but is inattentive to full ROS    Medications:    carvedilol 3.125 milliGRAM(s) Oral every 12 hours    ALBUTerol/ipratropium for Nebulization 3 milliLiter(s) Nebulizer every 6 hours PRN    aspirin 325 milliGRAM(s) Oral daily  busPIRone 5 milliGRAM(s) Oral two times a day  fluPHENAZine 5 milliGRAM(s) Oral every 12 hours      heparin  Injectable 5000 Unit(s) SubCutaneous every 8 hours        levothyroxine 75 MICROGram(s) Oral daily    sodium chloride 0.9%. 1000 milliLiter(s) IV Continuous <Continuous>                ICU Vital Signs Last 24 Hrs  T(C): 37.1 (12 Oct 2017 04:40), Max: 37.1 (12 Oct 2017 04:40)  T(F): 98.7 (12 Oct 2017 04:40), Max: 98.7 (12 Oct 2017 04:40)  HR: 86 (12 Oct 2017 09:00) (83 - 91)  BP: 106/52 (12 Oct 2017 09:00) (104/53 - 140/63)  BP(mean): 75 (12 Oct 2017 09:00) (71 - 90)  ABP: --  ABP(mean): --  RR: 14 (12 Oct 2017 09:00) (14 - 22)  SpO2: 95% (12 Oct 2017 09:00) (95% - 100%)          I&O's Detail    11 Oct 2017 07:01  -  12 Oct 2017 07:00  --------------------------------------------------------  IN:    sodium chloride 0.9%.: 1100 mL  Total IN: 1100 mL    OUT:    Indwelling Catheter - Urethral: 925 mL  Total OUT: 925 mL    Total NET: 175 mL      12 Oct 2017 07:01  -  12 Oct 2017 10:07  --------------------------------------------------------  IN:    IV PiggyBack: 50 mL    sodium chloride 0.9%.: 200 mL  Total IN: 250 mL    OUT:    Indwelling Catheter - Urethral: 150 mL  Total OUT: 150 mL    Total NET: 100 mL            LABS:                        8.7    11.7  )-----------( 162      ( 12 Oct 2017 06:12 )             25.5     10-12    126<L>  |  91<L>  |  48.0<H>  ----------------------------<  99  5.3   |  21.0<L>  |  1.80<H>    Ca    8.8      12 Oct 2017 06:12  Phos  3.4     10-12  Mg     1.4     10-12    TPro  6.1<L>  /  Alb  2.7<L>  /  TBili  0.3<L>  /  DBili  x   /  AST  16  /  ALT  15  /  AlkPhos  116  10-11      CARDIAC MARKERS ( 11 Oct 2017 21:21 )  x     / 0.14 ng/mL / x     / x     / x      CARDIAC MARKERS ( 11 Oct 2017 16:36 )  x     / 0.15 ng/mL / 143 U/L / x     / x          CAPILLARY BLOOD GLUCOSE          Urinalysis Basic - ( 12 Oct 2017 06:11 )    Color: Yellow / Appearance: Clear / S.010 / pH: x  Gluc: x / Ketone: Negative  / Bili: Negative / Urobili: Negative mg/dL   Blood: x / Protein: 30 mg/dL / Nitrite: Negative   Leuk Esterase: Moderate / RBC: 0-2 /HPF / WBC 6-10   Sq Epi: x / Non Sq Epi: Occasional / Bacteria: x      CULTURES:      Physical Examination:    General: No acute distress.      HEENT: Pupils equal, reactive to light.  Symmetric.    PULM: Clear to auscultation bilaterally, no significant sputum production    CVS: Regular rate and rhythm, no murmurs, rubs, or gallops    ABD: Soft, nondistended, nontender, normoactive bowel sounds, small 2x3cm lump in RLQ ?lypoma    EXT: No edema, nontender    SKIN: Warm and well perfused, no rashes noted.    NEURO: easily, arousable, follows for a few questions but then closes her eyes, exam is nonfocal     RADIOLOGY:   < from: Xray Chest 1 View AP/PA. (10.11.17 @ 18:03) >    FINDINGS:   The lungs  are clear.  No pleural abnormality is seen.       Prostatic aortic valve in place. The  heart is enlarged in transverse   diameter. No hilar mass. Trachea midline.    Visualized osseous structures are intact.        IMPRESSION:   No evidence of active chest disease.        Prosthetic aortic valve noted. The  heart is enlarged in transverse   diameter. No hilar mass. Trachea midline.         < end of copied text >    CRITICAL CARE TIME SPENT: ***

## 2017-10-12 NOTE — SWALLOW BEDSIDE ASSESSMENT ADULT - SWALLOW EVAL: RECOMMENDED FEEDING/EATING TECHNIQUES
position upright (90 degrees)/crush medication (when feasible)/oral hygiene/small sips/bites/Feed only when awake/alert

## 2017-10-12 NOTE — SWALLOW BEDSIDE ASSESSMENT ADULT - SWALLOW EVAL: DIAGNOSIS
Mild-moderate oral dysphagia marked by increased mastication and oral transit time, oral stage negatively impacted by cognitive status and lethargy; Suspect pharyngeal dysphagia marked by +cough after thin liquids.

## 2017-10-12 NOTE — PROGRESS NOTE ADULT - SUBJECTIVE AND OBJECTIVE BOX
Patient seen and examined  comfortable    I&O's Summary    11 Oct 2017 07:01  -  12 Oct 2017 07:00  --------------------------------------------------------  IN: 1100 mL / OUT: 925 mL / NET: 175 mL    12 Oct 2017 07:01  -  12 Oct 2017 15:38  --------------------------------------------------------  IN: 500 mL / OUT: 445 mL / NET: 55 mL        REVIEW OF SYSTEMS:    CONSTITUTIONAL: No F/C  RESPIRATORY: No cough or SOB  CARDIOVASCULAR: No CP/palpitations,    GASTROINTESTINAL: No abdominal pain , NVD   GENITOURINARY: No UTI sx  NEUROLOGICAL: No headaches/wk/numbness  MUSCULOSKELETAL:  No joint pain/swelling; No LBP  EXTREMITIES : no swelling,    Vital Signs Last 24 Hrs  T(C): 37.4 (12 Oct 2017 12:10), Max: 37.4 (12 Oct 2017 12:10)  T(F): 99.4 (12 Oct 2017 12:10), Max: 99.4 (12 Oct 2017 12:10)  HR: 85 (12 Oct 2017 15:00) (75 - 91)  BP: 144/61 (12 Oct 2017 15:00) (104/53 - 144/61)  BP(mean): 88 (12 Oct 2017 15:00) (71 - 90)  RR: 20 (12 Oct 2017 15:00) (13 - 26)  SpO2: 95% (12 Oct 2017 15:00) (95% - 100%)    PHYSICAL EXAM:    GENERAL: NAD,   EYES:  conjunctiva and sclera clear  NECK: Supple, No JVD/Bruit  NERVOUS SYSTEM:  A/O x3,   CHEST:  CTA ,No rales or rhonchi  HEART:  RRR, No murmurs  ABDOMEN: Soft, NT/ND BS+  EXTREMITIES:  No Edema;  SKIN: No rashes    LABS:                        8.7    11.7  )-----------( 162      ( 12 Oct 2017 06:12 )             25.5     10-12    126<L>  |  91<L>  |  48.0<H>  ----------------------------<  99  5.3   |  21.0<L>  |  1.80<H>    Ca    8.8      12 Oct 2017 06:12  Phos  3.4     10-12  Mg     1.4     10-12    TPro  6.1<L>  /  Alb  2.7<L>  /  TBili  0.3<L>  /  DBili  x   /  AST  16  /  ALT  15  /  AlkPhos  116  10-11      MEDICATIONS  (STANDING):  ALBUTerol/ipratropium for Nebulization PRN  aspirin  carvedilol  fluPHENAZine  heparin  Injectable  levothyroxine  lisinopril  sodium chloride 0.9%.

## 2017-10-12 NOTE — CONSULT NOTE ADULT - SUBJECTIVE AND OBJECTIVE BOX
Patient is a 77y old  Female who presents with a chief complaint of chest pain   hyponatermia (11 Oct 2017 21:21)      HPI:  The patient is a 77 year female presents to ED complaining of chest pain since last night. Described as pressure with SOB and radiation to L arm. (11 Oct 2017 21:21)  Presented with Na of 119 and CARY   Was on Ramipril PTA . 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 1000 milliLiter(s) (100 mL/Hr) IV Continuous <Continuous>    MEDICATIONS  (PRN):  ALBUTerol/ipratropium for Nebulization 3 milliLiter(s) Nebulizer every 6 hours PRN Shortness of Breath and/or Wheezing      Allergies    No Known Allergies    Intolerances        Vital Signs Last 24 Hrs  T(C): 36.9 (11 Oct 2017 23:22), Max: 36.9 (11 Oct 2017 23:22)  T(F): 98.5 (11 Oct 2017 23:22), Max: 98.5 (11 Oct 2017 23:22)  HR: 91 (11 Oct 2017 23:00) (86 - 91)  BP: 140/63 (11 Oct 2017 23:00) (113/72 - 140/63)  BP(mean): 90 (11 Oct 2017 23:00) (81 - 90)  RR: 22 (11 Oct 2017 23:00) (19 - 22)  SpO2: 99% (11 Oct 2017 23:00) (95% - 100%)  Daily Height in cm: 165.1 (11 Oct 2017 16:03)    Daily   I&O's Detail    11 Oct 2017 07:01  -  12 Oct 2017 00:34  --------------------------------------------------------  IN:    sodium chloride 0.9%.: 300 mL  Total IN: 300 mL    OUT:  Total OUT: 0 mL    Total NET: 300 mL        I&O's Summary    11 Oct 2017 07:01  -  12 Oct 2017 00:34  --------------------------------------------------------  IN: 300 mL / OUT: 0 mL / NET: 300 mL        PHYSICAL EXAM:    GENERAL: NAD,   HEAD: No edema , dry membranes   NECK: Supple, No JVD,   CHEST/LUNG:EAE , Clear , No wheeze   HEART: Regular rate and rhythm; No rub   ABDOMEN: Soft, Nontender, Nondistended;   EXTREMITIES:  No sig edema           LABS:                        9.9    16.8  )-----------( 196      ( 11 Oct 2017 16:36 )             28.4     10-11    124<L>  |  89<L>  |  51.0<H>  ----------------------------<  117<H>  5.8<H>   |  21.0<L>  |  1.95<H>    Ca    9.2      11 Oct 2017 22:18    TPro  6.1<L>  /  Alb  2.7<L>  /  TBili  0.3<L>  /  DBili  x   /  AST  16  /  ALT  15  /  AlkPhos  116  10-11                RADIOLOGY & ADDITIONAL TESTS:

## 2017-10-12 NOTE — BEHAVIORAL HEALTH ASSESSMENT NOTE - OTHER
Daughter-in-law, Psychiatrist and RN at [employer] [facility] [employer] [facility] x  years current change in treatment lethargic impaired 2/2 lethargy

## 2017-10-12 NOTE — BEHAVIORAL HEALTH ASSESSMENT NOTE - NSBHCHARTREVIEWLAB_PSY_A_CORE FT
8.7    11.7  )-----------( 162      ( 12 Oct 2017 06:12 )             25.5     10-12    126<L>  |  91<L>  |  48.0<H>  ----------------------------<  99  5.3   |  21.0<L>  |  1.80<H>    Ca    8.8      12 Oct 2017 06:12  Phos  3.4     10-12  Mg     1.4     10-12    TPro  6.1<L>  /  Alb  2.7<L>  /  TBili  0.3<L>  /  DBili  x   /  AST  16  /  ALT  15  /  AlkPhos  116  10-11    LIVER FUNCTIONS - ( 11 Oct 2017 22:18 )  Alb: 2.7 g/dL / Pro: 6.1 g/dL / ALK PHOS: 116 U/L / ALT: 15 U/L / AST: 16 U/L / GGT: x             Urinalysis Basic - ( 12 Oct 2017 06:11 )    Color: Yellow / Appearance: Clear / S.010 / pH: x  Gluc: x / Ketone: Negative  / Bili: Negative / Urobili: Negative mg/dL   Blood: x / Protein: 30 mg/dL / Nitrite: Negative   Leuk Esterase: Moderate / RBC: 0-2 /HPF / WBC 6-10   Sq Epi: x / Non Sq Epi: Occasional / Bacteria: x

## 2017-10-13 DIAGNOSIS — F20.9 SCHIZOPHRENIA, UNSPECIFIED: ICD-10-CM

## 2017-10-13 LAB
ANION GAP SERPL CALC-SCNC: 11 MMOL/L — SIGNIFICANT CHANGE UP (ref 5–17)
BUN SERPL-MCNC: 46 MG/DL — HIGH (ref 8–20)
CALCIUM SERPL-MCNC: 8.6 MG/DL — SIGNIFICANT CHANGE UP (ref 8.6–10.2)
CHLORIDE SERPL-SCNC: 96 MMOL/L — LOW (ref 98–107)
CO2 SERPL-SCNC: 21 MMOL/L — LOW (ref 22–29)
CORTIS AM PEAK SERPL-MCNC: 37.8 UG/DL — HIGH (ref 6–18.4)
CREAT SERPL-MCNC: 1.45 MG/DL — HIGH (ref 0.5–1.3)
FERRITIN SERPL-MCNC: 319 NG/ML — HIGH (ref 11–306)
GLUCOSE SERPL-MCNC: 149 MG/DL — HIGH (ref 70–115)
HCT VFR BLD CALC: 26.2 % — LOW (ref 37–47)
HGB BLD-MCNC: 8.8 G/DL — LOW (ref 12–16)
IRON SATN MFR SERPL: 19 UG/DL — LOW (ref 37–145)
IRON SATN MFR SERPL: 8 % — LOW (ref 14–50)
MAGNESIUM SERPL-MCNC: 2.1 MG/DL — SIGNIFICANT CHANGE UP (ref 1.6–2.6)
MCHC RBC-ENTMCNC: 26.8 PG — LOW (ref 27–31)
MCHC RBC-ENTMCNC: 33.6 G/DL — SIGNIFICANT CHANGE UP (ref 32–36)
MCV RBC AUTO: 79.9 FL — LOW (ref 81–99)
PLATELET # BLD AUTO: 150 K/UL — SIGNIFICANT CHANGE UP (ref 150–400)
POTASSIUM SERPL-MCNC: 4.9 MMOL/L — SIGNIFICANT CHANGE UP (ref 3.5–5.3)
POTASSIUM SERPL-SCNC: 4.9 MMOL/L — SIGNIFICANT CHANGE UP (ref 3.5–5.3)
RBC # BLD: 3.28 M/UL — LOW (ref 4.4–5.2)
RBC # FLD: 18.1 % — HIGH (ref 11–15.6)
SODIUM SERPL-SCNC: 128 MMOL/L — LOW (ref 135–145)
TIBC SERPL-MCNC: 237 UG/DL — SIGNIFICANT CHANGE UP (ref 220–430)
TRANSFERRIN SERPL-MCNC: 166 MG/DL — LOW (ref 192–382)
URATE SERPL-MCNC: 7.6 MG/DL — HIGH (ref 2.4–5.7)
WBC # BLD: 7.4 K/UL — SIGNIFICANT CHANGE UP (ref 4.8–10.8)
WBC # FLD AUTO: 7.4 K/UL — SIGNIFICANT CHANGE UP (ref 4.8–10.8)

## 2017-10-13 PROCEDURE — 99233 SBSQ HOSP IP/OBS HIGH 50: CPT

## 2017-10-13 PROCEDURE — 93970 EXTREMITY STUDY: CPT | Mod: 26

## 2017-10-13 PROCEDURE — 99232 SBSQ HOSP IP/OBS MODERATE 35: CPT

## 2017-10-13 RX ORDER — ACETAMINOPHEN 500 MG
650 TABLET ORAL EVERY 8 HOURS
Qty: 0 | Refills: 0 | Status: COMPLETED | OUTPATIENT
Start: 2017-10-13 | End: 2017-10-15

## 2017-10-13 RX ORDER — IRON SUCROSE 20 MG/ML
250 INJECTION, SOLUTION INTRAVENOUS DAILY
Qty: 0 | Refills: 0 | Status: DISCONTINUED | OUTPATIENT
Start: 2017-10-13 | End: 2017-10-15

## 2017-10-13 RX ADMIN — IRON SUCROSE 262.5 MILLIGRAM(S): 20 INJECTION, SOLUTION INTRAVENOUS at 11:59

## 2017-10-13 RX ADMIN — HEPARIN SODIUM 5000 UNIT(S): 5000 INJECTION INTRAVENOUS; SUBCUTANEOUS at 13:16

## 2017-10-13 RX ADMIN — Medication 3 MILLILITER(S): at 20:39

## 2017-10-13 RX ADMIN — SODIUM CHLORIDE 50 MILLILITER(S): 9 INJECTION INTRAMUSCULAR; INTRAVENOUS; SUBCUTANEOUS at 21:13

## 2017-10-13 RX ADMIN — SODIUM CHLORIDE 1 GRAM(S): 9 INJECTION INTRAMUSCULAR; INTRAVENOUS; SUBCUTANEOUS at 13:16

## 2017-10-13 RX ADMIN — FLUPHENAZINE HYDROCHLORIDE 5 MILLIGRAM(S): 1 TABLET, FILM COATED ORAL at 05:44

## 2017-10-13 RX ADMIN — HEPARIN SODIUM 5000 UNIT(S): 5000 INJECTION INTRAVENOUS; SUBCUTANEOUS at 21:48

## 2017-10-13 RX ADMIN — Medication 650 MILLIGRAM(S): at 18:10

## 2017-10-13 RX ADMIN — SODIUM CHLORIDE 1 GRAM(S): 9 INJECTION INTRAMUSCULAR; INTRAVENOUS; SUBCUTANEOUS at 05:44

## 2017-10-13 RX ADMIN — Medication 325 MILLIGRAM(S): at 11:59

## 2017-10-13 RX ADMIN — Medication 650 MILLIGRAM(S): at 17:16

## 2017-10-13 RX ADMIN — Medication 88 MICROGRAM(S): at 05:58

## 2017-10-13 RX ADMIN — CARVEDILOL PHOSPHATE 3.12 MILLIGRAM(S): 80 CAPSULE, EXTENDED RELEASE ORAL at 05:43

## 2017-10-13 RX ADMIN — HEPARIN SODIUM 5000 UNIT(S): 5000 INJECTION INTRAVENOUS; SUBCUTANEOUS at 05:43

## 2017-10-13 RX ADMIN — Medication 650 MILLIGRAM(S): at 21:48

## 2017-10-13 RX ADMIN — LISINOPRIL 20 MILLIGRAM(S): 2.5 TABLET ORAL at 05:58

## 2017-10-13 RX ADMIN — FLUPHENAZINE HYDROCHLORIDE 5 MILLIGRAM(S): 1 TABLET, FILM COATED ORAL at 17:16

## 2017-10-13 RX ADMIN — CARVEDILOL PHOSPHATE 3.12 MILLIGRAM(S): 80 CAPSULE, EXTENDED RELEASE ORAL at 17:17

## 2017-10-13 RX ADMIN — SODIUM CHLORIDE 1 GRAM(S): 9 INJECTION INTRAMUSCULAR; INTRAVENOUS; SUBCUTANEOUS at 21:48

## 2017-10-13 NOTE — PATIENT PROFILE ADULT. - PMH
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

## 2017-10-13 NOTE — PROGRESS NOTE BEHAVIORAL HEALTH - RISK ASSESSMENT
low- at this time patient is calm and cooperative. She denies A/V/H, thoughts of paranoia or S/H/I/I/P.

## 2017-10-13 NOTE — PROGRESS NOTE ADULT - ASSESSMENT
76 yo female with h/p CHF presented to the ED with c/o chest pain found to have hyponatremia and ARF , treated in ICU with fluid seen by nephrology sodium level improving, she is mentally better today

## 2017-10-13 NOTE — PROGRESS NOTE BEHAVIORAL HEALTH - SUMMARY
Patient seen for f/u today. She is currently A&Ox3, denies S/H/I/I/p, A/V/H, thoughts of paranoia and does not appear to be responding to internal stimuli. Patient continues to have sx of delirium 2/2 medical issues. Patient remains hyponatremic however serum NA is improving. Case discussed with DR Aguilar who will attempt to receive record from [facility].  Recommend  Continue Fluphenazine 5 mg q12h  Would not restart Paxil at this time  Psychiatry will continue to follow patient and will consider restarting Clozapine when medically stable to prevent psychotic episode.

## 2017-10-13 NOTE — PROGRESS NOTE ADULT - SUBJECTIVE AND OBJECTIVE BOX
She is more awake today oriented to place , c/o leg pain , not agitated   discussed with psychiatry NP and health care proxy over the phone     Allergies    No Known Allergies    Intolerances        REVIEW OF SYSTEMS:  as above limited exam     Vital Signs Last 24 Hrs  T(C): 36.7 (13 Oct 2017 07:58), Max: 37.4 (12 Oct 2017 23:48)  T(F): 98.1 (13 Oct 2017 07:58), Max: 99.3 (12 Oct 2017 23:48)  HR: 84 (13 Oct 2017 07:58) (77 - 92)  BP: 136/80 (13 Oct 2017 07:58) (102/46 - 144/61)  BP(mean): 84 (12 Oct 2017 16:00) (82 - 88)  RR: 18 (13 Oct 2017 07:58) (14 - 26)  SpO2: 99% (13 Oct 2017 07:58) (95% - 100%)  PHYSICAL EXAM:    GENERAL: NAD, well-groomed, well-developed  NECK: Supple, No JVD, Normal thyroid  NERVOUS SYSTEM:  Alert & Oriented X3, generalized weakness , no motor deficits   CHEST/LUNG: Clear to auscultation bilaterally ;No rales, rhonchi  HEART: Regular rate and rhythm; No murmurs, rubs, or gallops  ABDOMEN: Soft, Nontender, Nondistended; Bowel sounds present  EXTREMITIES:  2+ Peripheral Pulses, No clubbing, cyanosis, or edema  SKIN: No rashes , left thigh skin  ecchymotic area laterally       LABS:                                           8.8    7.4   )-----------( 150      ( 13 Oct 2017 08:20 )             26.2   10-13    128<L>  |  96<L>  |  46.0<H>  ----------------------------<  149<H>  4.9   |  21.0<L>  |  1.45<H>    Ca    8.6      13 Oct 2017 08:20  Phos  3.4     10-12  Mg     2.1     10-13    TPro  6.1<L>  /  Alb  2.7<L>  /  TBili  0.3<L>  /  DBili  x   /  AST  16  /  ALT  15  /  AlkPhos  116  10-11    RADIOLOGY & ADDITIONAL TESTS:

## 2017-10-13 NOTE — PROGRESS NOTE ADULT - SUBJECTIVE AND OBJECTIVE BOX
NEPHROLOGY INTERVAL HPI/OVERNIGHT EVENTS:    Examined earlier  Events noted    MEDICATIONS  (STANDING):  acetaminophen   Tablet. 650 milliGRAM(s) Oral every 8 hours  aspirin 325 milliGRAM(s) Oral daily  carvedilol 3.125 milliGRAM(s) Oral every 12 hours  fluPHENAZine 5 milliGRAM(s) Oral every 12 hours  heparin  Injectable 5000 Unit(s) SubCutaneous every 8 hours  iron sucrose IVPB 250 milliGRAM(s) IV Intermittent daily  levothyroxine 88 MICROGram(s) Oral daily  sodium chloride 1 Gram(s) Oral three times a day  sodium chloride 0.9%. 1000 milliLiter(s) (50 mL/Hr) IV Continuous <Continuous>    MEDICATIONS  (PRN):  ALBUTerol/ipratropium for Nebulization 3 milliLiter(s) Nebulizer every 6 hours PRN Shortness of Breath and/or Wheezing      Allergies    No Known Allergies    Intolerances        Vital Signs Last 24 Hrs  T(C): 36.4 (13 Oct 2017 15:42), Max: 37.4 (12 Oct 2017 23:48)  T(F): 97.5 (13 Oct 2017 15:42), Max: 99.3 (12 Oct 2017 23:48)  HR: 85 (13 Oct 2017 15:42) (83 - 92)  BP: 118/60 (13 Oct 2017 15:42) (102/46 - 136/80)  BP(mean): --  RR: 18 (13 Oct 2017 15:42) (17 - 18)  SpO2: 99% (13 Oct 2017 15:42) (99% - 100%)  Daily     Daily     PHYSICAL EXAM:    GENERAL: NAD, well-groomed, well-developed  HEAD:  Atraumatic, Normocephalic  EYES: EOMI, PERRLA, conjunctiva and sclera clear  ENMT: No tonsillar erythema, exudates, or enlargement; Moist mucous membranes, Good dentition, No lesions  NECK: Supple, No JVD, Normal thyroid  NERVOUS SYSTEM:  Alert & Oriented X3, Good concentration; Motor Strength 5/5 B/L upper and lower extremities; DTRs 2+ intact and symmetric  CHEST/LUNG: Clear to percussion bilaterally; No rales, rhonchi, wheezing, or rubs  HEART: Regular rate and rhythm; No murmurs, rubs, or gallops  ABDOMEN: Soft, Nontender, Nondistended; Bowel sounds present  EXTREMITIES:  2+ Peripheral Pulses, No clubbing, cyanosis, or edema  SKIN: No rashes or lesions    LABS:                        8.8    7.4   )-----------( 150      ( 13 Oct 2017 08:20 )             26.2     10-    128<L>  |  96<L>  |  46.0<H>  ----------------------------<  149<H>  4.9   |  21.0<L>  |  1.45<H>    Ca    8.6      13 Oct 2017 08:20  Phos  3.4     10-  Mg     2.1     10-13    TPro  6.1<L>  /  Alb  2.7<L>  /  TBili  0.3<L>  /  DBili  x   /  AST  16  /  ALT  15  /  AlkPhos  116  10-11      Urinalysis Basic - ( 12 Oct 2017 06:11 )    Color: Yellow / Appearance: Clear / S.010 / pH: x  Gluc: x / Ketone: Negative  / Bili: Negative / Urobili: Negative mg/dL   Blood: x / Protein: 30 mg/dL / Nitrite: Negative   Leuk Esterase: Moderate / RBC: 0-2 /HPF / WBC 6-10   Sq Epi: x / Non Sq Epi: Occasional / Bacteria: x      Magnesium, Serum: 2.1 mg/dL (10-13 @ 08:20)          RADIOLOGY & ADDITIONAL TESTS:

## 2017-10-13 NOTE — PROGRESS NOTE BEHAVIORAL HEALTH - DETAILS
weak Paxil may have been contributing to hyponatremia, however patient has been on Paxil for many years with stable NA levels.

## 2017-10-13 NOTE — PROGRESS NOTE BEHAVIORAL HEALTH - NSBHFUPINTERVALHXFT_PSY_A_CORE
Patient is a 76 y/o female with h/o schizophrenia admitted to [facility] 10/11/2017 c/o chest pain and altered mental status. Yesterday patient was lethargic and unable to answer most interview questions. Received collateral from Raul RGIGGS who reported Clozaril was discontinued while at [facility]. Writer attempted to reach [facility] to find out date and reason why medication was discontinued. Out-patient psychiatrist reports patient has been stable on Clozaril for many years and medication should be restarted. Today patient presents A&Ox3. She denies A/V/H, thoughts of paranoia and does not appear to be responding to internal stimuli. She remains in behavioral control and her 1:1 was discontinued.

## 2017-10-13 NOTE — PROGRESS NOTE ADULT - ASSESSMENT
Hyponatremia - better at 128 today; off paxil and Buspar   Add Nacl tabs; keep POF to 1000 cc/day  CARY on CKD cr 1.4 today sl better; sono unremarkable no hydro  need baseline creatinine; check creat clearance once creat stable  Anemia - TS 8 % cont IV iron Hyponatremia - better at 128 today; off paxil and Buspar -note prolixin an also cause SIADH  Add Nacl tabs; keep POF to 1000 cc/day  CARY on CKD cr 1.4 today sl better; sono unremarkable no hydro  need baseline creatinine; check creat clearance once creat stable  Anemia - TS 8 % cont IV iron

## 2017-10-14 LAB
ANION GAP SERPL CALC-SCNC: 11 MMOL/L — SIGNIFICANT CHANGE UP (ref 5–17)
BUN SERPL-MCNC: 40 MG/DL — HIGH (ref 8–20)
CALCIUM SERPL-MCNC: 8.6 MG/DL — SIGNIFICANT CHANGE UP (ref 8.6–10.2)
CHLORIDE SERPL-SCNC: 102 MMOL/L — SIGNIFICANT CHANGE UP (ref 98–107)
CO2 SERPL-SCNC: 22 MMOL/L — SIGNIFICANT CHANGE UP (ref 22–29)
CREAT SERPL-MCNC: 1.15 MG/DL — SIGNIFICANT CHANGE UP (ref 0.5–1.3)
GLUCOSE BLDC GLUCOMTR-MCNC: 267 MG/DL — HIGH (ref 70–99)
GLUCOSE SERPL-MCNC: 200 MG/DL — HIGH (ref 70–115)
POTASSIUM SERPL-MCNC: 4.9 MMOL/L — SIGNIFICANT CHANGE UP (ref 3.5–5.3)
POTASSIUM SERPL-SCNC: 4.9 MMOL/L — SIGNIFICANT CHANGE UP (ref 3.5–5.3)
SODIUM SERPL-SCNC: 135 MMOL/L — SIGNIFICANT CHANGE UP (ref 135–145)

## 2017-10-14 PROCEDURE — 99233 SBSQ HOSP IP/OBS HIGH 50: CPT

## 2017-10-14 RX ORDER — INFLUENZA VIRUS VACCINE 15; 15; 15; 15 UG/.5ML; UG/.5ML; UG/.5ML; UG/.5ML
0.5 SUSPENSION INTRAMUSCULAR ONCE
Qty: 0 | Refills: 0 | Status: DISCONTINUED | OUTPATIENT
Start: 2017-10-14 | End: 2017-10-18

## 2017-10-14 RX ORDER — DEXTROSE 50 % IN WATER 50 %
1 SYRINGE (ML) INTRAVENOUS ONCE
Qty: 0 | Refills: 0 | Status: DISCONTINUED | OUTPATIENT
Start: 2017-10-14 | End: 2017-10-18

## 2017-10-14 RX ORDER — INSULIN LISPRO 100/ML
VIAL (ML) SUBCUTANEOUS
Qty: 0 | Refills: 0 | Status: DISCONTINUED | OUTPATIENT
Start: 2017-10-14 | End: 2017-10-18

## 2017-10-14 RX ORDER — SODIUM CHLORIDE 9 MG/ML
1000 INJECTION, SOLUTION INTRAVENOUS
Qty: 0 | Refills: 0 | Status: DISCONTINUED | OUTPATIENT
Start: 2017-10-14 | End: 2017-10-18

## 2017-10-14 RX ORDER — DEXTROSE 50 % IN WATER 50 %
12.5 SYRINGE (ML) INTRAVENOUS ONCE
Qty: 0 | Refills: 0 | Status: DISCONTINUED | OUTPATIENT
Start: 2017-10-14 | End: 2017-10-18

## 2017-10-14 RX ORDER — DEXTROSE 50 % IN WATER 50 %
25 SYRINGE (ML) INTRAVENOUS ONCE
Qty: 0 | Refills: 0 | Status: DISCONTINUED | OUTPATIENT
Start: 2017-10-14 | End: 2017-10-18

## 2017-10-14 RX ORDER — GLUCAGON INJECTION, SOLUTION 0.5 MG/.1ML
1 INJECTION, SOLUTION SUBCUTANEOUS ONCE
Qty: 0 | Refills: 0 | Status: DISCONTINUED | OUTPATIENT
Start: 2017-10-14 | End: 2017-10-18

## 2017-10-14 RX ADMIN — CARVEDILOL PHOSPHATE 3.12 MILLIGRAM(S): 80 CAPSULE, EXTENDED RELEASE ORAL at 17:24

## 2017-10-14 RX ADMIN — FLUPHENAZINE HYDROCHLORIDE 5 MILLIGRAM(S): 1 TABLET, FILM COATED ORAL at 05:46

## 2017-10-14 RX ADMIN — Medication 650 MILLIGRAM(S): at 05:46

## 2017-10-14 RX ADMIN — SODIUM CHLORIDE 1 GRAM(S): 9 INJECTION INTRAMUSCULAR; INTRAVENOUS; SUBCUTANEOUS at 22:52

## 2017-10-14 RX ADMIN — Medication 325 MILLIGRAM(S): at 11:22

## 2017-10-14 RX ADMIN — SODIUM CHLORIDE 1 GRAM(S): 9 INJECTION INTRAMUSCULAR; INTRAVENOUS; SUBCUTANEOUS at 16:17

## 2017-10-14 RX ADMIN — Medication: at 17:31

## 2017-10-14 RX ADMIN — Medication 650 MILLIGRAM(S): at 22:52

## 2017-10-14 RX ADMIN — Medication 88 MICROGRAM(S): at 05:46

## 2017-10-14 RX ADMIN — SODIUM CHLORIDE 1 GRAM(S): 9 INJECTION INTRAMUSCULAR; INTRAVENOUS; SUBCUTANEOUS at 05:46

## 2017-10-14 RX ADMIN — HEPARIN SODIUM 5000 UNIT(S): 5000 INJECTION INTRAVENOUS; SUBCUTANEOUS at 22:52

## 2017-10-14 RX ADMIN — Medication 650 MILLIGRAM(S): at 05:18

## 2017-10-14 RX ADMIN — CARVEDILOL PHOSPHATE 3.12 MILLIGRAM(S): 80 CAPSULE, EXTENDED RELEASE ORAL at 05:46

## 2017-10-14 RX ADMIN — Medication 650 MILLIGRAM(S): at 17:24

## 2017-10-14 RX ADMIN — FLUPHENAZINE HYDROCHLORIDE 5 MILLIGRAM(S): 1 TABLET, FILM COATED ORAL at 17:24

## 2017-10-14 RX ADMIN — Medication 650 MILLIGRAM(S): at 22:03

## 2017-10-14 RX ADMIN — HEPARIN SODIUM 5000 UNIT(S): 5000 INJECTION INTRAVENOUS; SUBCUTANEOUS at 16:16

## 2017-10-14 RX ADMIN — HEPARIN SODIUM 5000 UNIT(S): 5000 INJECTION INTRAVENOUS; SUBCUTANEOUS at 05:46

## 2017-10-14 NOTE — PROGRESS NOTE ADULT - SUBJECTIVE AND OBJECTIVE BOX
NEPHROLOGY INTERVAL HPI/OVERNIGHT EVENTS:    Examined earlier  Comfortable    MEDICATIONS  (STANDING):  acetaminophen   Tablet. 650 milliGRAM(s) Oral every 8 hours  aspirin 325 milliGRAM(s) Oral daily  carvedilol 3.125 milliGRAM(s) Oral every 12 hours  fluPHENAZine 5 milliGRAM(s) Oral every 12 hours  heparin  Injectable 5000 Unit(s) SubCutaneous every 8 hours  iron sucrose IVPB 250 milliGRAM(s) IV Intermittent daily  levothyroxine 88 MICROGram(s) Oral daily  sodium chloride 1 Gram(s) Oral three times a day  sodium chloride 0.9%. 1000 milliLiter(s) (50 mL/Hr) IV Continuous <Continuous>    MEDICATIONS  (PRN):  ALBUTerol/ipratropium for Nebulization 3 milliLiter(s) Nebulizer every 6 hours PRN Shortness of Breath and/or Wheezing      Allergies    No Known Allergies    Intolerances        Vital Signs Last 24 Hrs  T(C): 36.8 (13 Oct 2017 23:35), Max: 36.8 (13 Oct 2017 23:35)  T(F): 98.3 (13 Oct 2017 23:35), Max: 98.3 (13 Oct 2017 23:35)  HR: 93 (13 Oct 2017 23:35) (85 - 93)  BP: 137/58 (13 Oct 2017 23:35) (118/60 - 137/58)  BP(mean): --  RR: 17 (13 Oct 2017 23:35) (17 - 18)  SpO2: 98% (13 Oct 2017 23:35) (98% - 99%)  Daily     Daily     PHYSICAL EXAM:  GENERAL: NAD,   HEAD: No edema , dry membranes   NECK: Supple, No JVD,   CHEST/LUNG:EAE , Clear , No wheeze   HEART: Regular rate and rhythm; No rub   ABDOMEN: Soft, Nontender, Nondistended;   EXTREMITIES:  No sig edema         LABS:                        8.8    7.4   )-----------( 150      ( 13 Oct 2017 08:20 )             26.2     10-14    135  |  102  |  40.0<H>  ----------------------------<  200<H>  4.9   |  22.0  |  1.15    Ca    8.6      14 Oct 2017 06:24  Mg     2.1     10-13                  RADIOLOGY & ADDITIONAL TESTS:

## 2017-10-14 NOTE — PROGRESS NOTE ADULT - PROBLEM SELECTOR PLAN 8
psychiatry evaluation  appreciated  Pt's off paxil due to hyponatremia, clozaril was discontinued at [facility] [place] per [facility] , do not start at this point per psychiatry
psychiatry evaluation  appreciated  Pt's off paxil due to hyponatremia, [place] [facility] [place] [facility] , do not start at this point per psychiatry
psychiatry evaluation  appreciated

## 2017-10-14 NOTE — PROGRESS NOTE ADULT - PROBLEM SELECTOR PLAN 1
continue iv fluid
continue iv fluid, improving   renal follow up appreciated
serial labs  normal saline cut to 50cc/h  monitor neuro status although appears to somewhat near baseline   keep K>4, mag >2.0  avoid any meds that can potentiate drop in NA
continue iv fluid, improving   renal follow up appreciated

## 2017-10-14 NOTE — PROGRESS NOTE ADULT - SUBJECTIVE AND OBJECTIVE BOX
CC:Follow up for hyponatremia     INTERVAL HPI/OVERNIGHT EVENTS:  Patient seen and examined, laying comfortably in bed. Denies any complaints.     Vital Signs Last 24 Hrs  T(C): 36.8 (13 Oct 2017 23:35), Max: 36.8 (13 Oct 2017 23:35)  T(F): 98.3 (13 Oct 2017 23:35), Max: 98.3 (13 Oct 2017 23:35)  HR: 93 (13 Oct 2017 23:35) (85 - 93)  BP: 137/58 (13 Oct 2017 23:35) (118/60 - 137/58)  BP(mean): --  RR: 17 (13 Oct 2017 23:35) (17 - 18)  SpO2: 98% (13 Oct 2017 23:35) (98% - 99%)    PHYSICAL EXAM:    GENERAL: NAD, AOX2  CHEST/LUNG: Clear to auscultation bilaterally; No rales, rhonchi, wheezing, or rubs  HEART: Regular rate and rhythm; No murmurs, rubs, or gallops  ABDOMEN: Soft, Nontender, Nondistended; Bowel sounds present + [place]   EXTREMITIES:  No pedal edema noted bilaterally         MEDICATIONS  (STANDING):  acetaminophen   Tablet. 650 milliGRAM(s) Oral every 8 hours  aspirin 325 milliGRAM(s) Oral daily  carvedilol 3.125 milliGRAM(s) Oral every 12 hours  fluPHENAZine 5 milliGRAM(s) Oral every 12 hours  heparin  Injectable 5000 Unit(s) SubCutaneous every 8 hours  iron sucrose IVPB 250 milliGRAM(s) IV Intermittent daily  levothyroxine 88 MICROGram(s) Oral daily  sodium chloride 1 Gram(s) Oral three times a day  sodium chloride 0.9%. 1000 milliLiter(s) (50 mL/Hr) IV Continuous <Continuous>    MEDICATIONS  (PRN):  ALBUTerol/ipratropium for Nebulization 3 milliLiter(s) Nebulizer every 6 hours PRN Shortness of Breath and/or Wheezing      Allergies    No Known Allergies    Intolerances          LABS:                          8.8    7.4   )-----------( 150      ( 13 Oct 2017 08:20 )             26.2     10-14    135  |  102  |  40.0<H>  ----------------------------<  200<H>  4.9   |  22.0  |  1.15    Ca    8.6      14 Oct 2017 06:24  Mg     2.1     10-13            RADIOLOGY & ADDITIONAL TESTS:

## 2017-10-14 NOTE — PROGRESS NOTE ADULT - ASSESSMENT
Hyponatremia - better at 135 today; off paxil and Buspar -note prolixin an also cause SIADH  Add Nacl tabs; keep POF to 1000 cc/day  CARY on CKD cr 1.4 today sl better; sono unremarkable no hydro  need baseline creatinine; check creat clearance once creat stable  Anemia - TS 8 % cont IV iron

## 2017-10-14 NOTE — PROGRESS NOTE ADULT - PROBLEM SELECTOR PROBLEM 2
Acute renal failure, unspecified acute renal failure type

## 2017-10-14 NOTE — PROGRESS NOTE ADULT - PROBLEM SELECTOR PLAN 5
stable no sign of  fluid overload

## 2017-10-14 NOTE — PROGRESS NOTE ADULT - PROBLEM SELECTOR PROBLEM 8
Schizoaffective disorder, unspecified type

## 2017-10-14 NOTE — PROGRESS NOTE ADULT - ASSESSMENT
The patient is a 77 year old female with a history of CHF, diabetes mellitus type 2 and schizophrenia who was admitted for chest pain, hyponatremia and acute renal failure. Treated in the ICU with iv fluids with improvement.  Was evaluated by psychiatry, possible SIADH, started on NaCl tabs, and buspar was discontinued. Renal following, normal renal ultrasound. The patient is a 77 year old female with a history of CHF, diabetes mellitus type 2 and schizophrenia who was admitted for chest pain, hyponatremia and acute renal failure. Treated in the ICU with iv fluids with improvement.  Was evaluated by psychiatry, possible SIADH, started on NaCl tabs, and buspar was discontinued. Renal following, normal renal ultrasound.     Assessment/Plan:    1. CARY: improved with iv hydration. Monitor bmp.  ACEI held    2. Hyponatremia: Resolved, NaCl tabs and iv fluids to continue today. Monitor I&Os, Romeo in place Follow up BMP in am    3. Iron deficiency anemia: On po ferrous sulfate, possible hematoma noted in left groin on ultrasound with no palpable swelling or tenderness on exam.- Monitor cbc for now    4. Hypothyroidism Dose of synthroid was increased.     5. Schizophrenia: On Prolixin, buspar was discontinued     6. Chronic systolic/diastolic chf: Stable, off acei for cary. On BB     7.  Diabetes mellitus type 2; Start humalog sliding scale for hyperglycemia, monitor bsl    VTE_ heparin subcut  PT evaluation ordered

## 2017-10-15 LAB
ANION GAP SERPL CALC-SCNC: 10 MMOL/L — SIGNIFICANT CHANGE UP (ref 5–17)
BUN SERPL-MCNC: 29 MG/DL — HIGH (ref 8–20)
CALCIUM SERPL-MCNC: 8.9 MG/DL — SIGNIFICANT CHANGE UP (ref 8.6–10.2)
CHLORIDE SERPL-SCNC: 103 MMOL/L — SIGNIFICANT CHANGE UP (ref 98–107)
CO2 SERPL-SCNC: 24 MMOL/L — SIGNIFICANT CHANGE UP (ref 22–29)
CREAT SERPL-MCNC: 1.04 MG/DL — SIGNIFICANT CHANGE UP (ref 0.5–1.3)
GLUCOSE BLDC GLUCOMTR-MCNC: 172 MG/DL — HIGH (ref 70–99)
GLUCOSE BLDC GLUCOMTR-MCNC: 227 MG/DL — HIGH (ref 70–99)
GLUCOSE BLDC GLUCOMTR-MCNC: 245 MG/DL — HIGH (ref 70–99)
GLUCOSE BLDC GLUCOMTR-MCNC: 258 MG/DL — HIGH (ref 70–99)
GLUCOSE SERPL-MCNC: 222 MG/DL — HIGH (ref 70–115)
HBA1C BLD-MCNC: 6.8 % — HIGH (ref 4–5.6)
HCT VFR BLD CALC: 28.2 % — LOW (ref 37–47)
HGB BLD-MCNC: 9.5 G/DL — LOW (ref 12–16)
MCHC RBC-ENTMCNC: 27.1 PG — SIGNIFICANT CHANGE UP (ref 27–31)
MCHC RBC-ENTMCNC: 33.7 G/DL — SIGNIFICANT CHANGE UP (ref 32–36)
MCV RBC AUTO: 80.6 FL — LOW (ref 81–99)
PLATELET # BLD AUTO: 156 K/UL — SIGNIFICANT CHANGE UP (ref 150–400)
POTASSIUM SERPL-MCNC: 4.9 MMOL/L — SIGNIFICANT CHANGE UP (ref 3.5–5.3)
POTASSIUM SERPL-SCNC: 4.9 MMOL/L — SIGNIFICANT CHANGE UP (ref 3.5–5.3)
RBC # BLD: 3.5 M/UL — LOW (ref 4.4–5.2)
RBC # FLD: 18.8 % — HIGH (ref 11–15.6)
SODIUM SERPL-SCNC: 137 MMOL/L — SIGNIFICANT CHANGE UP (ref 135–145)
WBC # BLD: 12.1 K/UL — HIGH (ref 4.8–10.8)
WBC # FLD AUTO: 12.1 K/UL — HIGH (ref 4.8–10.8)

## 2017-10-15 PROCEDURE — 99233 SBSQ HOSP IP/OBS HIGH 50: CPT

## 2017-10-15 RX ORDER — FERROUS SULFATE 325(65) MG
325 TABLET ORAL DAILY
Qty: 0 | Refills: 0 | Status: DISCONTINUED | OUTPATIENT
Start: 2017-10-15 | End: 2017-10-18

## 2017-10-15 RX ADMIN — HEPARIN SODIUM 5000 UNIT(S): 5000 INJECTION INTRAVENOUS; SUBCUTANEOUS at 15:10

## 2017-10-15 RX ADMIN — CARVEDILOL PHOSPHATE 3.12 MILLIGRAM(S): 80 CAPSULE, EXTENDED RELEASE ORAL at 17:44

## 2017-10-15 RX ADMIN — Medication 2: at 12:26

## 2017-10-15 RX ADMIN — Medication 325 MILLIGRAM(S): at 12:41

## 2017-10-15 RX ADMIN — HEPARIN SODIUM 5000 UNIT(S): 5000 INJECTION INTRAVENOUS; SUBCUTANEOUS at 21:20

## 2017-10-15 RX ADMIN — Medication 650 MILLIGRAM(S): at 05:54

## 2017-10-15 RX ADMIN — Medication 325 MILLIGRAM(S): at 12:28

## 2017-10-15 RX ADMIN — Medication 88 MICROGRAM(S): at 05:54

## 2017-10-15 RX ADMIN — Medication 650 MILLIGRAM(S): at 05:57

## 2017-10-15 RX ADMIN — SODIUM CHLORIDE 1 GRAM(S): 9 INJECTION INTRAMUSCULAR; INTRAVENOUS; SUBCUTANEOUS at 05:54

## 2017-10-15 RX ADMIN — HEPARIN SODIUM 5000 UNIT(S): 5000 INJECTION INTRAVENOUS; SUBCUTANEOUS at 05:54

## 2017-10-15 RX ADMIN — FLUPHENAZINE HYDROCHLORIDE 5 MILLIGRAM(S): 1 TABLET, FILM COATED ORAL at 17:44

## 2017-10-15 RX ADMIN — FLUPHENAZINE HYDROCHLORIDE 5 MILLIGRAM(S): 1 TABLET, FILM COATED ORAL at 05:54

## 2017-10-15 RX ADMIN — Medication 2: at 17:42

## 2017-10-15 RX ADMIN — Medication 650 MILLIGRAM(S): at 02:51

## 2017-10-15 RX ADMIN — SODIUM CHLORIDE 1 GRAM(S): 9 INJECTION INTRAMUSCULAR; INTRAVENOUS; SUBCUTANEOUS at 21:19

## 2017-10-15 RX ADMIN — CARVEDILOL PHOSPHATE 3.12 MILLIGRAM(S): 80 CAPSULE, EXTENDED RELEASE ORAL at 05:54

## 2017-10-15 RX ADMIN — Medication 3: at 08:43

## 2017-10-15 RX ADMIN — SODIUM CHLORIDE 1 GRAM(S): 9 INJECTION INTRAMUSCULAR; INTRAVENOUS; SUBCUTANEOUS at 15:09

## 2017-10-15 NOTE — PROGRESS NOTE ADULT - SUBJECTIVE AND OBJECTIVE BOX
CC: Feels weak     INTERVAL HPI/OVERNIGHT EVENTS: Patient seen and examined, she feels weak. Denies shortness of breath or chest pain. Sitting up in bed and wants to get up into a chair. Lost IV access yesterday and has not received iv fluids since then.         Vital Signs Last 24 Hrs  T(C): 36.8 (15 Oct 2017 08:00), Max: 36.8 (15 Oct 2017 08:00)  T(F): 98.2 (15 Oct 2017 08:00), Max: 98.2 (15 Oct 2017 08:00)  HR: 88 (15 Oct 2017 08:00) (80 - 88)  BP: 161/77 (15 Oct 2017 08:00) (137/61 - 161/77)  BP(mean): --  RR: 18 (15 Oct 2017 08:00) (18 - 18)  SpO2: 100% (15 Oct 2017 08:00) (93% - 100%)    PHYSICAL EXAM:    GENERAL: NAD, AOx2  HEAD:  Atraumatic, Normocephalic  EYES: EOMI, PERRLA, conjunctiva and sclera clear  ENMT: Moist mucous membranes  NECK: Supple, No JVD  CHEST/LUNG: Clear to auscultation bilaterally; No rales, rhonchi, wheezing, or rubs  HEART: Regular rate and rhythm; No murmurs, rubs, or gallops  ABDOMEN: Soft, Nontender, Nondistended; Bowel sounds present + malave   EXTREMITIES:  2+ Peripheral Pulses, No clubbing, cyanosis, or edema        MEDICATIONS  (STANDING):  aspirin 325 milliGRAM(s) Oral daily  carvedilol 3.125 milliGRAM(s) Oral every 12 hours  dextrose 5%. 1000 milliLiter(s) (50 mL/Hr) IV Continuous <Continuous>  dextrose 50% Injectable 12.5 Gram(s) IV Push once  dextrose 50% Injectable 25 Gram(s) IV Push once  dextrose 50% Injectable 25 Gram(s) IV Push once  ferrous    sulfate 325 milliGRAM(s) Oral daily  fluPHENAZine 5 milliGRAM(s) Oral every 12 hours  heparin  Injectable 5000 Unit(s) SubCutaneous every 8 hours  influenza   Vaccine 0.5 milliLiter(s) IntraMuscular once  insulin lispro (HumaLOG) corrective regimen sliding scale   SubCutaneous three times a day before meals  levothyroxine 88 MICROGram(s) Oral daily  sodium chloride 1 Gram(s) Oral three times a day    MEDICATIONS  (PRN):  ALBUTerol/ipratropium for Nebulization 3 milliLiter(s) Nebulizer every 6 hours PRN Shortness of Breath and/or Wheezing  dextrose Gel 1 Dose(s) Oral once PRN Blood Glucose LESS THAN 70 milliGRAM(s)/deciliter  glucagon  Injectable 1 milliGRAM(s) IntraMuscular once PRN Glucose LESS THAN 70 milligrams/deciliter      Allergies    No Known Allergies    Intolerances          LABS:                          9.5    12.1  )-----------( 156      ( 15 Oct 2017 06:51 )             28.2     10-15    137  |  103  |  29.0<H>  ----------------------------<  222<H>  4.9   |  24.0  |  1.04    Ca    8.9      15 Oct 2017 06:51            RADIOLOGY & ADDITIONAL TESTS:

## 2017-10-15 NOTE — PROGRESS NOTE ADULT - ASSESSMENT
Hyponatremia - better at 137 today; off paxil and Buspar   -note prolixin an also cause SIADH   Cont Nacl tabs; cont fluid restrict 1000 cc/day  CARY on CKD cr 1.0 today sl better; sono unremarkable no hydro  Anemia - TS 8 % cont IV iron

## 2017-10-15 NOTE — PROGRESS NOTE ADULT - SUBJECTIVE AND OBJECTIVE BOX
NEPHROLOGY INTERVAL HPI/OVERNIGHT EVENTS:    Examined earlier  Looks comfortable     MEDICATIONS  (STANDING):  aspirin 325 milliGRAM(s) Oral daily  carvedilol 3.125 milliGRAM(s) Oral every 12 hours  dextrose 5%. 1000 milliLiter(s) (50 mL/Hr) IV Continuous <Continuous>  dextrose 50% Injectable 12.5 Gram(s) IV Push once  dextrose 50% Injectable 25 Gram(s) IV Push once  dextrose 50% Injectable 25 Gram(s) IV Push once  ferrous    sulfate 325 milliGRAM(s) Oral daily  fluPHENAZine 5 milliGRAM(s) Oral every 12 hours  heparin  Injectable 5000 Unit(s) SubCutaneous every 8 hours  influenza   Vaccine 0.5 milliLiter(s) IntraMuscular once  insulin lispro (HumaLOG) corrective regimen sliding scale   SubCutaneous three times a day before meals  levothyroxine 88 MICROGram(s) Oral daily  sodium chloride 1 Gram(s) Oral three times a day    MEDICATIONS  (PRN):  ALBUTerol/ipratropium for Nebulization 3 milliLiter(s) Nebulizer every 6 hours PRN Shortness of Breath and/or Wheezing  dextrose Gel 1 Dose(s) Oral once PRN Blood Glucose LESS THAN 70 milliGRAM(s)/deciliter  glucagon  Injectable 1 milliGRAM(s) IntraMuscular once PRN Glucose LESS THAN 70 milligrams/deciliter      Allergies    No Known Allergies    Intolerances        Vital Signs Last 24 Hrs  T(C): 36.6 (15 Oct 2017 00:49), Max: 36.6 (15 Oct 2017 00:49)  T(F): 97.8 (15 Oct 2017 00:49), Max: 97.8 (15 Oct 2017 00:49)  HR: 80 (15 Oct 2017 00:49) (80 - 82)  BP: 149/74 (15 Oct 2017 00:49) (137/61 - 149/74)  BP(mean): --  RR: 18 (15 Oct 2017 00:49) (18 - 18)  SpO2: 93% (15 Oct 2017 00:49) (93% - 98%)  Daily     Daily     PHYSICAL EXAM:  GENERAL: NAD,   HEAD: No edema  NECK: Supple, No JVD,   CHEST: Clear , No wheeze   HEART: Regular rate and rhythm; No rub   ABDOMEN: Soft, Nontender, Nondistended  EXTREMITIES:  No sig edema     LABS:                        9.5    12.1  )-----------( 156      ( 15 Oct 2017 06:51 )             28.2     10-15    137  |  103  |  29.0<H>  ----------------------------<  222<H>  4.9   |  24.0  |  1.04    Ca    8.9      15 Oct 2017 06:51                  RADIOLOGY & ADDITIONAL TESTS:

## 2017-10-16 LAB
ANION GAP SERPL CALC-SCNC: 10 MMOL/L — SIGNIFICANT CHANGE UP (ref 5–17)
BUN SERPL-MCNC: 25 MG/DL — HIGH (ref 8–20)
CALCIUM SERPL-MCNC: 8.4 MG/DL — LOW (ref 8.6–10.2)
CHLORIDE SERPL-SCNC: 103 MMOL/L — SIGNIFICANT CHANGE UP (ref 98–107)
CO2 SERPL-SCNC: 26 MMOL/L — SIGNIFICANT CHANGE UP (ref 22–29)
CREAT SERPL-MCNC: 0.85 MG/DL — SIGNIFICANT CHANGE UP (ref 0.5–1.3)
GLUCOSE BLDC GLUCOMTR-MCNC: 168 MG/DL — HIGH (ref 70–99)
GLUCOSE BLDC GLUCOMTR-MCNC: 234 MG/DL — HIGH (ref 70–99)
GLUCOSE BLDC GLUCOMTR-MCNC: 244 MG/DL — HIGH (ref 70–99)
GLUCOSE BLDC GLUCOMTR-MCNC: 264 MG/DL — HIGH (ref 70–99)
GLUCOSE SERPL-MCNC: 272 MG/DL — HIGH (ref 70–115)
HCT VFR BLD CALC: 29.4 % — LOW (ref 37–47)
HGB BLD-MCNC: 9.7 G/DL — LOW (ref 12–16)
MCHC RBC-ENTMCNC: 26.9 PG — LOW (ref 27–31)
MCHC RBC-ENTMCNC: 33 G/DL — SIGNIFICANT CHANGE UP (ref 32–36)
MCV RBC AUTO: 81.4 FL — SIGNIFICANT CHANGE UP (ref 81–99)
PLATELET # BLD AUTO: 195 K/UL — SIGNIFICANT CHANGE UP (ref 150–400)
POTASSIUM SERPL-MCNC: 5 MMOL/L — SIGNIFICANT CHANGE UP (ref 3.5–5.3)
POTASSIUM SERPL-SCNC: 5 MMOL/L — SIGNIFICANT CHANGE UP (ref 3.5–5.3)
RBC # BLD: 3.61 M/UL — LOW (ref 4.4–5.2)
RBC # FLD: 18.6 % — HIGH (ref 11–15.6)
SODIUM SERPL-SCNC: 139 MMOL/L — SIGNIFICANT CHANGE UP (ref 135–145)
WBC # BLD: 11.3 K/UL — HIGH (ref 4.8–10.8)
WBC # FLD AUTO: 11.3 K/UL — HIGH (ref 4.8–10.8)

## 2017-10-16 PROCEDURE — 99233 SBSQ HOSP IP/OBS HIGH 50: CPT

## 2017-10-16 PROCEDURE — 74230 X-RAY XM SWLNG FUNCJ C+: CPT | Mod: 26

## 2017-10-16 RX ORDER — ASPIRIN/CALCIUM CARB/MAGNESIUM 324 MG
1 TABLET ORAL
Qty: 0 | Refills: 0 | COMMUNITY
Start: 2017-10-16

## 2017-10-16 RX ORDER — SODIUM CHLORIDE 0.65 %
1 AEROSOL, SPRAY (ML) NASAL
Qty: 0 | Refills: 0 | Status: DISCONTINUED | OUTPATIENT
Start: 2017-10-16 | End: 2017-10-18

## 2017-10-16 RX ORDER — LEVOTHYROXINE SODIUM 125 MCG
1 TABLET ORAL
Qty: 0 | Refills: 0 | COMMUNITY
Start: 2017-10-16

## 2017-10-16 RX ADMIN — Medication 2: at 17:35

## 2017-10-16 RX ADMIN — Medication 325 MILLIGRAM(S): at 17:28

## 2017-10-16 RX ADMIN — Medication 325 MILLIGRAM(S): at 17:30

## 2017-10-16 RX ADMIN — CARVEDILOL PHOSPHATE 3.12 MILLIGRAM(S): 80 CAPSULE, EXTENDED RELEASE ORAL at 06:20

## 2017-10-16 RX ADMIN — HEPARIN SODIUM 5000 UNIT(S): 5000 INJECTION INTRAVENOUS; SUBCUTANEOUS at 17:29

## 2017-10-16 RX ADMIN — HEPARIN SODIUM 5000 UNIT(S): 5000 INJECTION INTRAVENOUS; SUBCUTANEOUS at 21:44

## 2017-10-16 RX ADMIN — Medication 650 MILLIGRAM(S): at 20:19

## 2017-10-16 RX ADMIN — FLUPHENAZINE HYDROCHLORIDE 5 MILLIGRAM(S): 1 TABLET, FILM COATED ORAL at 06:20

## 2017-10-16 RX ADMIN — FLUPHENAZINE HYDROCHLORIDE 5 MILLIGRAM(S): 1 TABLET, FILM COATED ORAL at 17:34

## 2017-10-16 RX ADMIN — CARVEDILOL PHOSPHATE 3.12 MILLIGRAM(S): 80 CAPSULE, EXTENDED RELEASE ORAL at 17:34

## 2017-10-16 RX ADMIN — Medication: at 12:29

## 2017-10-16 RX ADMIN — Medication 2: at 08:29

## 2017-10-16 RX ADMIN — HEPARIN SODIUM 5000 UNIT(S): 5000 INJECTION INTRAVENOUS; SUBCUTANEOUS at 06:20

## 2017-10-16 RX ADMIN — SODIUM CHLORIDE 1 GRAM(S): 9 INJECTION INTRAMUSCULAR; INTRAVENOUS; SUBCUTANEOUS at 06:20

## 2017-10-16 RX ADMIN — Medication 88 MICROGRAM(S): at 06:20

## 2017-10-16 NOTE — SWALLOW VFSS/MBS ASSESSMENT ADULT - SLP PERTINENT HISTORY OF CURRENT PROBLEM
Pt seen bedside for swallow eval & re-assessment: most recently seen: 10/15/17 with RX for puree & nectar thick fluids & MBS

## 2017-10-16 NOTE — SWALLOW VFSS/MBS ASSESSMENT ADULT - MODE OF PRESENTATION
spoon/fed by clinician fed by clinician/spoon fed by clinician/cup cup/fed by clinician fed by clinician

## 2017-10-16 NOTE — DISCHARGE NOTE ADULT - PROVIDER TOKENS
TOKEN:'[id number]' TOKEN:'[id number]',FREE:[LAST:[Salhab],FIRST:[Khalid],PHONE:[(   )    -],FAX:[(   )    -]]

## 2017-10-16 NOTE — DISCHARGE NOTE ADULT - MEDICATION SUMMARY - MEDICATIONS TO STOP TAKING
I will STOP taking the medications listed below when I get home from the hospital:    busPIRone 5 mg oral tablet  -- 5 milligram(s) by mouth 2 times a day    HumaLOG 100 units/mL subcutaneous solution  -- 100 unit(s) subcutaneous once a day    heparin 5000 units/mL injectable solution  -- 5000 unit(s) injectable 12 times a day

## 2017-10-16 NOTE — SWALLOW VFSS/MBS ASSESSMENT ADULT - NS SWALLOW VFSS REC ASPIR MON
fever/pneumonia/oral hygiene/position upright (90Y)/change of breathing pattern/cough/gurgly voice/throat clearing/upper respiratory infection

## 2017-10-16 NOTE — DISCHARGE NOTE ADULT - MEDICATION SUMMARY - MEDICATIONS TO CHANGE
I will SWITCH the dose or number of times a day I take the medications listed below when I get home from the hospital:    levothyroxine 75 mcg (0.075 mg) oral capsule  -- 1 cap(s) by mouth once a day

## 2017-10-16 NOTE — SWALLOW VFSS/MBS ASSESSMENT ADULT - PHARYNGEAL PHASE COMMENTS
Cough noted post swallow, however, NO PO observed in airway Strategies not attempted due to reduced cognition

## 2017-10-16 NOTE — DISCHARGE NOTE ADULT - PLAN OF CARE
Resolved Monitor bmp Synthroid increased to 88mcg OD Pureed with nectar thick diet continue current medications  Buspar dc'd 1500 cc Fluid Restriction  continue ACE  Lasix as needed Started on Zosyn 10/17  Duo neb  supplemental oxygen Started on Zosyn 10/17 for HCAP   Duo neb  supplemental oxygen

## 2017-10-16 NOTE — SWALLOW VFSS/MBS ASSESSMENT ADULT - ORAL PREPARATORY PHASE
Functional slow mastication, ? impacted by reduced cognition/Functional munching chew pattern/Insufficient mastication/Poor bolus formation

## 2017-10-16 NOTE — DISCHARGE NOTE ADULT - HOSPITAL COURSE
The patient is a 77 year old female with a history of CHF, diabetes mellitus type 2 and schizophrenia who was admitted for chest pain, hyponatremia and acute renal failure. Treated in the ICU with iv fluids with improvement.  Was evaluated by psychiatry, possible SIADH, started on NaCl tabs, and buspar was discontinued. Renal following, normal renal ultrasound. The patient is a 77 year old female with a history of CHF, diabetes mellitus type 2 and schizophrenia, S/P TAVR at [facility] 9/2017,  who was admitted for chest pain, hyponatremia and acute renal failure. Treated in the ICU with iv fluids with improvement.  Was evaluated by psychiatry, possible SIADH, started on NaCl tabs, and buspar was discontinued. Renal following, normal renal ultrasound. Started on iron supplement for anemia of chronic disease.  CARY resolved. Patient underwent MBS and recommend Pureed diet with Nectar liquids. Patient being treated for PNA, started on Zosyn on 10/17/17. As patient is S/P TAVR less than 30 days will be transferred to [facility], receiving MD Dr. Vani Glez.   Vital Signs Last 24 Hrs  T(C): 36.8 (17 Oct 2017 07:40), Max: 36.9 (16 Oct 2017 23:52)  T(F): 98.2 (17 Oct 2017 07:40), Max: 98.4 (16 Oct 2017 23:52)  HR: 58 (17 Oct 2017 07:40) (58 - 86)  BP: 122/60 (17 Oct 2017 07:40) (110/69 - 157/78)  BP(mean): --  RR: 20 (17 Oct 2017 07:40) (18 - 20)  SpO2: 99% (17 Oct 2017 07:40) (98% - 100%)                            9.2    11.5  )-----------( 231      ( 17 Oct 2017 08:11 )             29.4     17 Oct 2017 08:11    137    |  102    |  23.0   ----------------------------<  207    5.2     |  25.0   |  0.77     Ca    8.1        17 Oct 2017 08:11    TPro  5.5    /  Alb  2.7    /  TBili  0.3    /  DBili  x      /  AST  9      /  ALT  8      /  AlkPhos  91     17 Oct 2017 08:11      CAPILLARY BLOOD GLUCOSE      POCT Blood Glucose.: 263 mg/dL (17 Oct 2017 11:30)  POCT Blood Glucose.: 168 mg/dL (16 Oct 2017 21:06)  POCT Blood Glucose.: 244 mg/dL (16 Oct 2017 17:24)    LIVER FUNCTIONS - ( 17 Oct 2017 08:11 )  Alb: 2.7 g/dL / Pro: 5.5 g/dL / ALK PHOS: 91 U/L / ALT: 8 U/L / AST: 9 U/L / GGT: x             Hemoglobin A1C, Whole Blood: 6.8 % (10-15 @ 06:51)    PHYSICAL EXAM:    GENERAL: NAD,AOX2  CHEST/LUNG: Bilateral base crackles, no wheeze  HEART: Regular rate and rhythm; No murmurs, rubs, or gallops  ABDOMEN: Soft, Nontender, Nondistended; Bowel sounds present  EXTREMITIES:  2+ Peripheral Pulses, No clubbing, cyanosis, or edema     EXAM:  CHEST SINGLE VIEW FRONTAL                          PROCEDURE DATE:  10/17/2017          INTERPRETATION:    INDICATION: Dysphagia.  FINDINGS /   IMPRESSION:     There is elevation of the left hemidiaphragm.  There is patchy infiltrate   in left lower lobe, no pleural effusion. Cardiac and mediastinal   structures are within normal limits.    There is osteopenia and   degenerative changes. The patient is a 77 year old female with a history of CHF, diabetes mellitus type 2 and schizophrenia, S/P TAVR at [facility] 9/2017,  who was admitted for chest pain, hyponatremia and acute renal failure. Treated in the ICU with iv fluids with improvement.  Was evaluated by psychiatry, possible SIADH, started on NaCl tabs, and buspar was discontinued. Renal following, normal renal ultrasound. Started on iron supplement for anemia of chronic disease.  CARY resolved. Patient underwent MBS and recommend Pureed diet with Nectar liquids. Patient being treated for PNA, started on Zosyn on 10/17/17. As patient is S/P TAVR less than 30 days will be transferred to [facility], receiving MD Dr. Vani Glez.   Vital Signs Last 24 Hrs  T(C): 36.8 (17 Oct 2017 07:40), Max: 36.9 (16 Oct 2017 23:52)  T(F): 98.2 (17 Oct 2017 07:40), Max: 98.4 (16 Oct 2017 23:52)  HR: 58 (17 Oct 2017 07:40) (58 - 86)  BP: 122/60 (17 Oct 2017 07:40) (110/69 - 157/78)  BP(mean): --  RR: 20 (17 Oct 2017 07:40) (18 - 20)  SpO2: 99% (17 Oct 2017 07:40) (98% - 100%)                            9.2    11.5  )-----------( 231      ( 17 Oct 2017 08:11 )             29.4     17 Oct 2017 08:11    137    |  102    |  23.0   ----------------------------<  207    5.2     |  25.0   |  0.77     Ca    8.1        17 Oct 2017 08:11    TPro  5.5    /  Alb  2.7    /  TBili  0.3    /  DBili  x      /  AST  9      /  ALT  8      /  AlkPhos  91     17 Oct 2017 08:11      CAPILLARY BLOOD GLUCOSE      POCT Blood Glucose.: 263 mg/dL (17 Oct 2017 11:30)  POCT Blood Glucose.: 168 mg/dL (16 Oct 2017 21:06)  POCT Blood Glucose.: 244 mg/dL (16 Oct 2017 17:24)    LIVER FUNCTIONS - ( 17 Oct 2017 08:11 )  Alb: 2.7 g/dL / Pro: 5.5 g/dL / ALK PHOS: 91 U/L / ALT: 8 U/L / AST: 9 U/L / GGT: x             Hemoglobin A1C, Whole Blood: 6.8 % (10-15 @ 06:51)    PHYSICAL EXAM:    GENERAL: NAD,AOX2  CHEST/LUNG: Bilateral base crackles, no wheeze  HEART: Regular rate and rhythm; No murmurs, rubs, or gallops  ABDOMEN: Soft, Nontender, Nondistended; Bowel sounds present  EXTREMITIES:  2+ Peripheral Pulses, No clubbing, cyanosis, or edema     EXAM:  CHEST SINGLE VIEW FRONTAL                          PROCEDURE DATE:  10/17/2017          INTERPRETATION:    INDICATION: Dysphagia.  FINDINGS /   IMPRESSION:     There is elevation of the left hemidiaphragm.  There is patchy infiltrate   in left lower lobe, no pleural effusion. Cardiac and mediastinal   structures are within normal limits.    There is osteopenia and   degenerative changes.     45 mins spent coordinating care and discharge to [facility] The patient is a 77 year old female with a history of CHF, diabetes mellitus type 2 and schizophrenia, S/P TAVR at [facility] 9/2017,  who was admitted for chest pain, hyponatremia and acute renal failure. Treated in the ICU with iv fluids with improvement.  Was evaluated by psychiatry, possible SIADH, started on NaCl tabs, and buspar was discontinued. Renal following, normal renal ultrasound. Started on iron supplement for anemia of chronic disease.  CARY resolved. Patient underwent MBS and recommend Pureed diet with Nectar liquids. Patient being treated for PNA, started on Zosyn on 10/17/17. As patient is S/P TAVR less than 30 days will be transferred to [facility], receiving MD Dr. Vani Glez. Patient c/o chest pressure this am. Given sublingual nitro x 1 and Lasix 20 mg IV. EKG reveals T wave inversion V1-V3. Trop 0.07. Patient currently pain free. Hemodynamically stable. Transfer to [place] via ambulance.    Vital Signs Last 24 Hrs  T(C): 36.6 (18 Oct 2017 07:55), Max: 36.7 (18 Oct 2017 01:32)  T(F): 97.8 (18 Oct 2017 07:55), Max: 98 (18 Oct 2017 01:32)  HR: 72 (18 Oct 2017 07:55) (69 - 77)  BP: 146/70 (18 Oct 2017 07:55) (125/71 - 146/70)  BP(mean): --  RR: 18 (18 Oct 2017 07:55) (18 - 18)  SpO2: 99% (18 Oct 2017 07:55) (96% - 100%)    CARDIAC MARKERS ( 18 Oct 2017 07:59 )  x     / 0.07 ng/mL / 12 U/L / x     / x                                9.2    11.5  )-----------( 231      ( 17 Oct 2017 08:11 )             29.4     18 Oct 2017 07:59    135    |  96     |  17.0   ----------------------------<  213    4.6     |  26.0   |  0.75     Ca    8.2        18 Oct 2017 07:59  Phos  2.4       18 Oct 2017 07:59  Mg     1.4       18 Oct 2017 07:59    TPro  5.6    /  Alb  2.7    /  TBili  0.3    /  DBili  x      /  AST  9      /  ALT  <4     /  AlkPhos  91     18 Oct 2017 07:59      CAPILLARY BLOOD GLUCOSE      POCT Blood Glucose.: 213 mg/dL (18 Oct 2017 08:31)  POCT Blood Glucose.: 198 mg/dL (17 Oct 2017 21:16)  POCT Blood Glucose.: 150 mg/dL (17 Oct 2017 16:40)    LIVER FUNCTIONS - ( 18 Oct 2017 07:59 )  Alb: 2.7 g/dL / Pro: 5.6 g/dL / ALK PHOS: 91 U/L / ALT: <4 U/L / AST: 9 U/L / GGT: x             Hemoglobin A1C, Whole Blood: 6.8 % (10-15 @ 06:51)      PHYSICAL EXAM:    GENERAL: NAD,AOX2  CHEST/LUNG: Bilateral base crackles, no wheeze  HEART: Regular rate and rhythm; No murmurs, rubs, or gallops  ABDOMEN: Soft, Nontender, Nondistended; Bowel sounds present  EXTREMITIES:  2+ Peripheral Pulses, No clubbing, cyanosis, or edema     EXAM:  CHEST SINGLE VIEW FRONTAL                          PROCEDURE DATE:  10/17/2017          INTERPRETATION:    INDICATION: Dysphagia.  FINDINGS /   IMPRESSION:     There is elevation of the left hemidiaphragm.  There is patchy infiltrate   in left lower lobe, no pleural effusion. Cardiac and mediastinal   structures are within normal limits.    There is osteopenia and   degenerative changes.     45 mins spent coordinating care and discharge to [facility] The patient is a 77 year old female with a history of CHF, diabetes mellitus type 2 and schizophrenia, S/P TAVR at [facility] 9/2017,  who was admitted for chest pain, hyponatremia and acute renal failure. Treated in the ICU with iv fluids with improvement.  Was evaluated by psychiatry, possible SIADH, started on NaCl tabs, and buspar was discontinued. Renal following, normal renal ultrasound. Started on iron supplement for anemia of chronic disease.  CARY resolved. Patient underwent MBS and recommend Pureed diet with Nectar liquids. Patient being treated for Hospital Acquired PNA, started on Zosyn on 10/17/17. As patient is S/P TAVR less than 30 days will be transferred to [facility], receiving MD Dr. Vani Glez. Patient c/o chest pressure this am. Given sublingual nitro x 1 and Lasix 20 mg IV. EKG reveals T wave inversion V1-V3. Trop 0.07. Patient currently pain free. Hemodynamically stable. Transfer to [facility] via ambulance.    Vital Signs Last 24 Hrs  T(C): 36.6 (18 Oct 2017 07:55), Max: 36.7 (18 Oct 2017 01:32)  T(F): 97.8 (18 Oct 2017 07:55), Max: 98 (18 Oct 2017 01:32)  HR: 72 (18 Oct 2017 07:55) (69 - 77)  BP: 146/70 (18 Oct 2017 07:55) (125/71 - 146/70)  BP(mean): --  RR: 18 (18 Oct 2017 07:55) (18 - 18)  SpO2: 99% (18 Oct 2017 07:55) (96% - 100%)    CARDIAC MARKERS ( 18 Oct 2017 07:59 )  x     / 0.07 ng/mL / 12 U/L / x     / x                                9.2    11.5  )-----------( 231      ( 17 Oct 2017 08:11 )             29.4     18 Oct 2017 07:59    135    |  96     |  17.0   ----------------------------<  213    4.6     |  26.0   |  0.75     Ca    8.2        18 Oct 2017 07:59  Phos  2.4       18 Oct 2017 07:59  Mg     1.4       18 Oct 2017 07:59    TPro  5.6    /  Alb  2.7    /  TBili  0.3    /  DBili  x      /  AST  9      /  ALT  <4     /  AlkPhos  91     18 Oct 2017 07:59      CAPILLARY BLOOD GLUCOSE      POCT Blood Glucose.: 213 mg/dL (18 Oct 2017 08:31)  POCT Blood Glucose.: 198 mg/dL (17 Oct 2017 21:16)  POCT Blood Glucose.: 150 mg/dL (17 Oct 2017 16:40)    LIVER FUNCTIONS - ( 18 Oct 2017 07:59 )  Alb: 2.7 g/dL / Pro: 5.6 g/dL / ALK PHOS: 91 U/L / ALT: <4 U/L / AST: 9 U/L / GGT: x             Hemoglobin A1C, Whole Blood: 6.8 % (10-15 @ 06:51)      PHYSICAL EXAM:    GENERAL: NAD,AOX2  CHEST/LUNG: Bilateral base crackles, no wheeze  HEART: Regular rate and rhythm; No murmurs, rubs, or gallops  ABDOMEN: Soft, Nontender, Nondistended; Bowel sounds present  EXTREMITIES:  2+ Peripheral Pulses, No clubbing, cyanosis, or edema     EXAM:  CHEST SINGLE VIEW FRONTAL                          PROCEDURE DATE:  10/17/2017          INTERPRETATION:    INDICATION: Dysphagia.  FINDINGS /   IMPRESSION:     There is elevation of the left hemidiaphragm.  There is patchy infiltrate   in left lower lobe, no pleural effusion. Cardiac and mediastinal   structures are within normal limits.    There is osteopenia and   degenerative changes.     45 mins spent coordinating care and discharge to [facility]

## 2017-10-16 NOTE — SWALLOW VFSS/MBS ASSESSMENT ADULT - ORAL PHASE
piecemeal deglutition/Uncontrolled bolus / spillover in sudarshan-pharynx/Reduced anterior - posterior transport Delayed oral transit time/Reduced anterior - posterior transport/piecemeal deglutition piecemeal deglutition/Uncontrolled bolus / spillover in sudarshan-pharynx Delayed oral transit time/mild oral holding due to reduced cognition; piecemeal deglutition/Uncontrolled bolus / spillover in sudarshan-pharynx piecemeal deglutition/Uncontrolled bolus / spillover in hypopharynx bolus removed from oral cavity by ST due to poor oral management/Delayed oral transit time

## 2017-10-16 NOTE — PROGRESS NOTE ADULT - SUBJECTIVE AND OBJECTIVE BOX
CC: WEakness     INTERVAL HPI/OVERNIGHT EVENTS: Patient seen and examined, accidently pulled out her malave this morning. Voiding well thereafter. Denies any shortness of breath, chest pain or palpitations. Would like to get out of bed.       Vital Signs Last 24 Hrs  T(C): 37.4 (16 Oct 2017 08:00), Max: 37.4 (16 Oct 2017 08:00)  T(F): 99.4 (16 Oct 2017 08:00), Max: 99.4 (16 Oct 2017 08:00)  HR: 86 (16 Oct 2017 08:00) (79 - 90)  BP: 145/74 (16 Oct 2017 08:00) (138/62 - 161/75)  BP(mean): --  RR: 18 (16 Oct 2017 08:00) (18 - 20)  SpO2: 100% (16 Oct 2017 08:00) (96% - 100%)    PHYSICAL EXAM:    GENERAL: NAD,AOX2  CHEST/LUNG: Clear to auscultation bilaterally; No rales, rhonchi, wheezing, or rubs  HEART: Regular rate and rhythm; No murmurs, rubs, or gallops  ABDOMEN: Soft, Nontender, Nondistended; Bowel sounds present  EXTREMITIES:  2+ Peripheral Pulses, No clubbing, cyanosis, or edema        MEDICATIONS  (STANDING):  aspirin 325 milliGRAM(s) Oral daily  carvedilol 3.125 milliGRAM(s) Oral every 12 hours  dextrose 5%. 1000 milliLiter(s) (50 mL/Hr) IV Continuous <Continuous>  dextrose 50% Injectable 12.5 Gram(s) IV Push once  dextrose 50% Injectable 25 Gram(s) IV Push once  dextrose 50% Injectable 25 Gram(s) IV Push once  ferrous    sulfate 325 milliGRAM(s) Oral daily  fluPHENAZine 5 milliGRAM(s) Oral every 12 hours  heparin  Injectable 5000 Unit(s) SubCutaneous every 8 hours  influenza   Vaccine 0.5 milliLiter(s) IntraMuscular once  insulin lispro (HumaLOG) corrective regimen sliding scale   SubCutaneous three times a day before meals  levothyroxine 88 MICROGram(s) Oral daily    MEDICATIONS  (PRN):  ALBUTerol/ipratropium for Nebulization 3 milliLiter(s) Nebulizer every 6 hours PRN Shortness of Breath and/or Wheezing  dextrose Gel 1 Dose(s) Oral once PRN Blood Glucose LESS THAN 70 milliGRAM(s)/deciliter  glucagon  Injectable 1 milliGRAM(s) IntraMuscular once PRN Glucose LESS THAN 70 milligrams/deciliter      Allergies    No Known Allergies    Intolerances          LABS:                          9.5    12.1  )-----------( 156      ( 15 Oct 2017 06:51 )             28.2     10-15    137  |  103  |  29.0<H>  ----------------------------<  222<H>  4.9   |  24.0  |  1.04    Ca    8.9      15 Oct 2017 06:51            RADIOLOGY & ADDITIONAL TESTS:

## 2017-10-16 NOTE — SWALLOW VFSS/MBS ASSESSMENT ADULT - RECOMMENDED FEEDING/EATING TECHNIQUES
position upright (90 degrees)/allow for swallow between intakes/maintain upright posture during/after eating for 30 mins/oral hygiene/small sips/bites/crush medication (when feasible)/no straws

## 2017-10-16 NOTE — DISCHARGE NOTE ADULT - MEDICATION SUMMARY - MEDICATIONS TO TAKE
I will START or STAY ON the medications listed below when I get home from the hospital:    aspirin 325 mg oral tablet  -- 1 tab(s) by mouth once a day  -- Indication: For Aortic valve replaced    ramipril 5 mg oral tablet  -- 5 milligram(s) by mouth once a day  -- Indication: For HTN    heparin  -- 5000 unit(s) subcutaneous every 8 hours  -- Indication: For DVT ppx    insulin lispro 100 units/mL subcutaneous solution  --  subcutaneous 3 times a day (before meals); 1 Unit(s) if Glucose 151 - 200  2 Unit(s) if Glucose 201 - 250  3 Unit(s) if Glucose 251 - 300  4 Unit(s) if Glucose 301 - 350  5 Unit(s) if Glucose 351 - 400  6 Unit(s) if Glucose Greater Than 400  -- Indication: For Diabetes    metFORMIN 500 mg oral tablet  -- 1 tab(s) by mouth 2 times a day  -- Indication: For Diabetes    fluPHENAZine 5 mg oral tablet  -- 5 milligram(s) by mouth every 12 hours  -- Indication: For Schizoaffective disorder, unspecified type    Coreg 3.125 mg oral tablet  -- 1 tab(s) by mouth 2 times a day  -- Indication: For HTN    ipratropium-albuterol 0.5 mg-2.5 mg/3 mLinhalation solution  -- 3 milliliter(s) inhaled every 6 hours, As needed, Shortness of Breath and/or Wheezing  -- Indication: For Chronic obstructive pulmonary disease, unspecified COPD type    sodium chloride 0.65% nasal spray  -- 1 spray(s) into nose 4 times a day, As Needed  -- Indication: For nasalm congestion    piperacillin-tazobactam 2 g-0.25 g intravenous injection  -- 3.375 gram(s) intravenous every 8 hours  -- Indication: For PNA    omeprazole 10 mg oral delayed release capsule  -- 1 cap(s) by mouth once a day  -- Indication: For GERD    levothyroxine 88 mcg (0.088 mg) oral tablet  -- 1 tab(s) by mouth once a day  -- Indication: For Hypothyroidism, unspecified type I will START or STAY ON the medications listed below when I get home from the hospital:    aspirin 325 mg oral tablet  -- 1 tab(s) by mouth once a day  -- Indication: For Aortic valve replaced    ramipril 5 mg oral tablet  -- 5 milligram(s) by mouth once a day  -- Indication: For HTN    heparin  -- 5000 unit(s) subcutaneous every 8 hours  -- Indication: For DVT ppx    insulin lispro 100 units/mL subcutaneous solution  --  subcutaneous 3 times a day (before meals); 1 Unit(s) if Glucose 151 - 200  2 Unit(s) if Glucose 201 - 250  3 Unit(s) if Glucose 251 - 300  4 Unit(s) if Glucose 301 - 350  5 Unit(s) if Glucose 351 - 400  6 Unit(s) if Glucose Greater Than 400  -- Indication: For Diabetes    metFORMIN 500 mg oral tablet  -- 1 tab(s) by mouth 2 times a day  -- Indication: For Diabetes    fluPHENAZine 5 mg oral tablet  -- 5 milligram(s) by mouth every 12 hours  -- Indication: For Schizoaffective disorder, unspecified type    Coreg 3.125 mg oral tablet  -- 1 tab(s) by mouth 2 times a day  -- Indication: For HTN    ipratropium-albuterol 0.5 mg-2.5 mg/3 mLinhalation solution  -- 3 milliliter(s) inhaled every 6 hours, As needed, Shortness of Breath and/or Wheezing  -- Indication: For Chronic obstructive pulmonary disease, unspecified COPD type    sodium chloride 0.65% nasal spray  -- 1 spray(s) into nose 4 times a day, As Needed  -- Indication: For nasalm congestion    piperacillin-tazobactam 2 g-0.25 g intravenous injection  -- 3.375 gram(s) intravenous every 8 hours  -- Indication: For PNA    saccharomyces boulardii lyo 250 mg oral capsule  -- 1 cap(s) by mouth 2 times a day  -- Indication: For C diff ppx    omeprazole 10 mg oral delayed release capsule  -- 1 cap(s) by mouth once a day  -- Indication: For GERD    levothyroxine 88 mcg (0.088 mg) oral tablet  -- 1 tab(s) by mouth once a day  -- Indication: For Hypothyroidism, unspecified type

## 2017-10-16 NOTE — PROGRESS NOTE ADULT - ASSESSMENT
The patient is a 77 year old female with a history of CHF, diabetes mellitus type 2 and schizophrenia who was admitted for chest pain, hyponatremia and acute renal failure. Treated in the ICU with iv fluids with improvement.  Was evaluated by psychiatry, possible SIADH, started on NaCl tabs, and buspar was discontinued. Renal following, normal renal ultrasound.     Assessment/Plan:    1. CARY: improved with iv hydration. Monitor bmp.  ACEI held, fluids discontinued    2. Hyponatremia: Resolved, NaCl tabs and fluid restriction. Fluids were discontinued. Monitor I&Os, Romeo in place Follow up BMP in am    3. Iron deficiency anemia: On po ferrous sulfate, possible hematoma noted in left groin on ultrasound with no palpable swelling or tenderness on exam.- Monitor cbc for now    4. Hypothyroidism Dose of synthroid was increased.     5. Schizophrenia: On Prolixin, buspar was discontinued     6. Chronic systolic/diastolic chf: Stable, off acei for cary. On BB     7.  Diabetes mellitus type 2; Start humalog sliding scale for hyperglycemia, monitor bsl    8. Dysphagia: Modified barium swallow to be done today     VTE_ heparin subcut  PT evaluation ordered    Discharge planning pending MBS and PT evaluation The patient is a 77 year old female with a history of CHF, diabetes mellitus type 2 and schizophrenia who was admitted for chest pain, hyponatremia and acute renal failure. Treated in the ICU with iv fluids with improvement.  Was evaluated by psychiatry, possible SIADH, started on NaCl tabs, and buspar was discontinued. Renal following, normal renal ultrasound.     Assessment/Plan:    1. CARY: improved with iv hydration. Monitor bmp.  ACEI held, fluids discontinued    2. Hyponatremia: Resolved, NaCl tabs and fluid restriction were discontinue by nephrology. Fluids were discontinued. Monitor I&Os, Romeo in place Follow up BMP in am    3. Iron deficiency anemia: On po ferrous sulfate, possible hematoma noted in left groin on ultrasound with no palpable swelling or tenderness on exam.- Monitor cbc for now    4. Hypothyroidism Dose of synthroid was increased.     5. Schizophrenia: On Prolixin, buspar was discontinued     6. Chronic systolic/diastolic chf: Stable, off acei for cary. On BB     7.  Diabetes mellitus type 2; Start humalog sliding scale for hyperglycemia, monitor bsl    8. Dysphagia: Modified barium swallow to be done today     VTE_ heparin subcut  PT evaluation ordered    Discharge planning pending MBS and PT evaluation

## 2017-10-16 NOTE — DISCHARGE NOTE ADULT - SECONDARY DIAGNOSIS.
Hyponatremia Hypothyroidism, unspecified type Schizoaffective disorder, unspecified type Dysphagia, unspecified type Chronic diastolic congestive heart failure Pneumonia

## 2017-10-16 NOTE — DISCHARGE NOTE ADULT - CARE PLAN
Principal Discharge DX:	Acute renal failure, unspecified acute renal failure type  Goal:	Resolved  Instructions for follow-up, activity and diet:	Monitor bmp  Secondary Diagnosis:	Hyponatremia  Instructions for follow-up, activity and diet:	Monitor bmp  Secondary Diagnosis:	Hypothyroidism, unspecified type  Instructions for follow-up, activity and diet:	Synthroid increased to 88mcg OD  Secondary Diagnosis:	Schizoaffective disorder, unspecified type  Secondary Diagnosis:	Dysphagia, unspecified type  Instructions for follow-up, activity and diet:	Pureed with nectar thick diet Principal Discharge DX:	Acute renal failure, unspecified acute renal failure type  Goal:	Resolved  Instructions for follow-up, activity and diet:	Monitor bmp  Secondary Diagnosis:	Hyponatremia  Instructions for follow-up, activity and diet:	Monitor bmp  Secondary Diagnosis:	Hypothyroidism, unspecified type  Instructions for follow-up, activity and diet:	Synthroid increased to 88mcg OD  Secondary Diagnosis:	Schizoaffective disorder, unspecified type  Instructions for follow-up, activity and diet:	continue current medications  Buspar dc'd  Secondary Diagnosis:	Dysphagia, unspecified type  Instructions for follow-up, activity and diet:	Pureed with nectar thick diet  Secondary Diagnosis:	Chronic diastolic congestive heart failure  Instructions for follow-up, activity and diet:	1500 cc Fluid Restriction  continue ACE  Lasix as needed  Secondary Diagnosis:	Pneumonia  Instructions for follow-up, activity and diet:	Started on Zosyn 10/17  Duo neb  supplemental oxygen Principal Discharge DX:	Acute renal failure, unspecified acute renal failure type  Goal:	Resolved  Instructions for follow-up, activity and diet:	Monitor bmp  Secondary Diagnosis:	Hyponatremia  Instructions for follow-up, activity and diet:	Monitor bmp  Secondary Diagnosis:	Hypothyroidism, unspecified type  Instructions for follow-up, activity and diet:	Synthroid increased to 88mcg OD  Secondary Diagnosis:	Schizoaffective disorder, unspecified type  Instructions for follow-up, activity and diet:	continue current medications  Buspar dc'd  Secondary Diagnosis:	Dysphagia, unspecified type  Instructions for follow-up, activity and diet:	Pureed with nectar thick diet  Secondary Diagnosis:	Chronic diastolic congestive heart failure  Instructions for follow-up, activity and diet:	1500 cc Fluid Restriction  continue ACE  Lasix as needed  Secondary Diagnosis:	Pneumonia  Instructions for follow-up, activity and diet:	Started on Zosyn 10/17 for HCAP   Duo neb  supplemental oxygen

## 2017-10-16 NOTE — SWALLOW VFSS/MBS ASSESSMENT ADULT - DIAGNOSTIC IMPRESSIONS
Mild to moderate oral stage dysphagia impacted by reduced cognition, marked by reduced lingual control & reduced mastication. Pharyngeal stage WFL for puree, mech soft, honey & nectar thick fluids. Moderate pharyngeal dysphagia marked by reduced laryngeal elevation & reduced airway closure, resulting in silent penetration during the swallow.  Compensatory strategies not attempted due to reduced cognition.

## 2017-10-16 NOTE — DISCHARGE NOTE ADULT - CARE PROVIDER_API CALL
Sadiq Womack), Internal Medicine; Nephrology  [address] [place] [address] [id number]  Phone: [phone]  Fax: [phone] Sadiq Womack), Internal Medicine; Nephrology  [address] [place] [address] [id number]  Phone: [phone]  Fax: [phone]    Vani Richardson  Phone: (   )    -  Fax: (   )    -

## 2017-10-16 NOTE — PROGRESS NOTE ADULT - SUBJECTIVE AND OBJECTIVE BOX
NEPHROLOGY INTERVAL HPI/OVERNIGHT EVENTS: No new events.    MEDICATIONS  (STANDING):  aspirin 325 milliGRAM(s) Oral daily  carvedilol 3.125 milliGRAM(s) Oral every 12 hours  dextrose 5%. 1000 milliLiter(s) (50 mL/Hr) IV Continuous <Continuous>  dextrose 50% Injectable 12.5 Gram(s) IV Push once  dextrose 50% Injectable 25 Gram(s) IV Push once  dextrose 50% Injectable 25 Gram(s) IV Push once  ferrous    sulfate 325 milliGRAM(s) Oral daily  fluPHENAZine 5 milliGRAM(s) Oral every 12 hours  heparin  Injectable 5000 Unit(s) SubCutaneous every 8 hours  influenza   Vaccine 0.5 milliLiter(s) IntraMuscular once  insulin lispro (HumaLOG) corrective regimen sliding scale   SubCutaneous three times a day before meals  levothyroxine 88 MICROGram(s) Oral daily    MEDICATIONS  (PRN):  ALBUTerol/ipratropium for Nebulization 3 milliLiter(s) Nebulizer every 6 hours PRN Shortness of Breath and/or Wheezing  dextrose Gel 1 Dose(s) Oral once PRN Blood Glucose LESS THAN 70 milliGRAM(s)/deciliter  glucagon  Injectable 1 milliGRAM(s) IntraMuscular once PRN Glucose LESS THAN 70 milligrams/deciliter      Allergies    No Known Allergies    Intolerances        Vital Signs Last 24 Hrs  T(C): 36.9 (15 Oct 2017 23:58), Max: 37 (15 Oct 2017 19:53)  T(F): 98.4 (15 Oct 2017 23:58), Max: 98.6 (15 Oct 2017 19:53)  HR: 89 (15 Oct 2017 23:58) (79 - 90)  BP: 161/75 (15 Oct 2017 23:58) (138/62 - 161/75)  BP(mean): --  RR: 20 (15 Oct 2017 23:58) (18 - 20)  SpO2: 97% (15 Oct 2017 23:58) (96% - 98%)  Daily     Daily     PHYSICAL EXAM:    GENERAL: comfortable in bed  HEAD:  wnl  EYES:   ENMT:   NECK: veins  not full  NERVOUS SYSTEM: awake, verbal   CHEST/LUNG: clear, no wheezes, pt on nasal 02  HEART: no gallop or rub  ABDOMEN: distended, not tender  EXTREMITIES:  no edema  LYMPH:   SKIN: pale   neg, I&O  noted  LABS:                        9.5    12.1  )-----------( 156      ( 15 Oct 2017 06:51 )             28.2     10-15    137  |  103  |  29.0<H>  ----------------------------<  222<H>  4.9   |  24.0  |  1.04    Ca    8.9      15 Oct 2017 06:51                  RADIOLOGY & ADDITIONAL TESTS:

## 2017-10-17 LAB
ALBUMIN SERPL ELPH-MCNC: 2.7 G/DL — LOW (ref 3.3–5.2)
ALP SERPL-CCNC: 91 U/L — SIGNIFICANT CHANGE UP (ref 40–120)
ALT FLD-CCNC: 8 U/L — SIGNIFICANT CHANGE UP
ANION GAP SERPL CALC-SCNC: 10 MMOL/L — SIGNIFICANT CHANGE UP (ref 5–17)
AST SERPL-CCNC: 9 U/L — SIGNIFICANT CHANGE UP
BILIRUB SERPL-MCNC: 0.3 MG/DL — LOW (ref 0.4–2)
BUN SERPL-MCNC: 23 MG/DL — HIGH (ref 8–20)
CALCIUM SERPL-MCNC: 8.1 MG/DL — LOW (ref 8.6–10.2)
CHLORIDE SERPL-SCNC: 102 MMOL/L — SIGNIFICANT CHANGE UP (ref 98–107)
CO2 SERPL-SCNC: 25 MMOL/L — SIGNIFICANT CHANGE UP (ref 22–29)
CREAT SERPL-MCNC: 0.77 MG/DL — SIGNIFICANT CHANGE UP (ref 0.5–1.3)
GLUCOSE BLDC GLUCOMTR-MCNC: 150 MG/DL — HIGH (ref 70–99)
GLUCOSE BLDC GLUCOMTR-MCNC: 198 MG/DL — HIGH (ref 70–99)
GLUCOSE BLDC GLUCOMTR-MCNC: 263 MG/DL — HIGH (ref 70–99)
GLUCOSE SERPL-MCNC: 207 MG/DL — HIGH (ref 70–115)
HCT VFR BLD CALC: 29.4 % — LOW (ref 37–47)
HGB BLD-MCNC: 9.2 G/DL — LOW (ref 12–16)
MCHC RBC-ENTMCNC: 26.4 PG — LOW (ref 27–31)
MCHC RBC-ENTMCNC: 31.3 G/DL — LOW (ref 32–36)
MCV RBC AUTO: 84.5 FL — SIGNIFICANT CHANGE UP (ref 81–99)
NT-PROBNP SERPL-SCNC: HIGH PG/ML (ref 0–300)
PLATELET # BLD AUTO: 231 K/UL — SIGNIFICANT CHANGE UP (ref 150–400)
POTASSIUM SERPL-MCNC: 5.2 MMOL/L — SIGNIFICANT CHANGE UP (ref 3.5–5.3)
POTASSIUM SERPL-SCNC: 5.2 MMOL/L — SIGNIFICANT CHANGE UP (ref 3.5–5.3)
PROT SERPL-MCNC: 5.5 G/DL — LOW (ref 6.6–8.7)
RBC # BLD: 3.48 M/UL — LOW (ref 4.4–5.2)
RBC # FLD: 19.4 % — HIGH (ref 11–15.6)
SODIUM SERPL-SCNC: 137 MMOL/L — SIGNIFICANT CHANGE UP (ref 135–145)
WBC # BLD: 11.5 K/UL — HIGH (ref 4.8–10.8)
WBC # FLD AUTO: 11.5 K/UL — HIGH (ref 4.8–10.8)

## 2017-10-17 PROCEDURE — 99233 SBSQ HOSP IP/OBS HIGH 50: CPT

## 2017-10-17 PROCEDURE — 71010: CPT | Mod: 26

## 2017-10-17 RX ORDER — PIPERACILLIN AND TAZOBACTAM 4; .5 G/20ML; G/20ML
3.38 INJECTION, POWDER, LYOPHILIZED, FOR SOLUTION INTRAVENOUS
Qty: 0 | Refills: 0 | COMMUNITY
Start: 2017-10-17

## 2017-10-17 RX ORDER — IPRATROPIUM/ALBUTEROL SULFATE 18-103MCG
3 AEROSOL WITH ADAPTER (GRAM) INHALATION
Qty: 0 | Refills: 0 | COMMUNITY
Start: 2017-10-17

## 2017-10-17 RX ORDER — ASPIRIN/CALCIUM CARB/MAGNESIUM 324 MG
1 TABLET ORAL
Qty: 0 | Refills: 0 | COMMUNITY

## 2017-10-17 RX ORDER — INSULIN LISPRO 100/ML
100 VIAL (ML) SUBCUTANEOUS
Qty: 0 | Refills: 0 | COMMUNITY

## 2017-10-17 RX ORDER — FUROSEMIDE 40 MG
20 TABLET ORAL ONCE
Qty: 0 | Refills: 0 | Status: COMPLETED | OUTPATIENT
Start: 2017-10-17 | End: 2017-10-17

## 2017-10-17 RX ORDER — INSULIN LISPRO 100/ML
0 VIAL (ML) SUBCUTANEOUS
Qty: 0 | Refills: 0 | COMMUNITY
Start: 2017-10-17

## 2017-10-17 RX ORDER — LEVOTHYROXINE SODIUM 125 MCG
1 TABLET ORAL
Qty: 0 | Refills: 0 | COMMUNITY

## 2017-10-17 RX ORDER — FERROUS SULFATE 325(65) MG
1 TABLET ORAL
Qty: 30 | Refills: 0 | OUTPATIENT
Start: 2017-10-17 | End: 2017-11-16

## 2017-10-17 RX ORDER — SODIUM CHLORIDE 0.65 %
1 AEROSOL, SPRAY (ML) NASAL
Qty: 0 | Refills: 0 | COMMUNITY
Start: 2017-10-17

## 2017-10-17 RX ORDER — PIPERACILLIN AND TAZOBACTAM 4; .5 G/20ML; G/20ML
3.38 INJECTION, POWDER, LYOPHILIZED, FOR SOLUTION INTRAVENOUS EVERY 8 HOURS
Qty: 0 | Refills: 0 | Status: DISCONTINUED | OUTPATIENT
Start: 2017-10-17 | End: 2017-10-18

## 2017-10-17 RX ORDER — HEPARIN SODIUM 5000 [USP'U]/ML
5000 INJECTION INTRAVENOUS; SUBCUTANEOUS
Qty: 0 | Refills: 0 | COMMUNITY
Start: 2017-10-17

## 2017-10-17 RX ORDER — HEPARIN SODIUM 5000 [USP'U]/ML
5000 INJECTION INTRAVENOUS; SUBCUTANEOUS
Qty: 0 | Refills: 0 | COMMUNITY

## 2017-10-17 RX ADMIN — Medication 88 MICROGRAM(S): at 06:16

## 2017-10-17 RX ADMIN — Medication 20 MILLIGRAM(S): at 09:54

## 2017-10-17 RX ADMIN — CARVEDILOL PHOSPHATE 3.12 MILLIGRAM(S): 80 CAPSULE, EXTENDED RELEASE ORAL at 17:17

## 2017-10-17 RX ADMIN — PIPERACILLIN AND TAZOBACTAM 25 GRAM(S): 4; .5 INJECTION, POWDER, LYOPHILIZED, FOR SOLUTION INTRAVENOUS at 22:32

## 2017-10-17 RX ADMIN — Medication 3 MILLILITER(S): at 16:52

## 2017-10-17 RX ADMIN — HEPARIN SODIUM 5000 UNIT(S): 5000 INJECTION INTRAVENOUS; SUBCUTANEOUS at 13:29

## 2017-10-17 RX ADMIN — HEPARIN SODIUM 5000 UNIT(S): 5000 INJECTION INTRAVENOUS; SUBCUTANEOUS at 06:16

## 2017-10-17 RX ADMIN — FLUPHENAZINE HYDROCHLORIDE 5 MILLIGRAM(S): 1 TABLET, FILM COATED ORAL at 06:16

## 2017-10-17 RX ADMIN — HEPARIN SODIUM 5000 UNIT(S): 5000 INJECTION INTRAVENOUS; SUBCUTANEOUS at 22:32

## 2017-10-17 RX ADMIN — Medication 3: at 11:31

## 2017-10-17 RX ADMIN — Medication 325 MILLIGRAM(S): at 11:35

## 2017-10-17 RX ADMIN — Medication 325 MILLIGRAM(S): at 11:32

## 2017-10-17 RX ADMIN — PIPERACILLIN AND TAZOBACTAM 25 GRAM(S): 4; .5 INJECTION, POWDER, LYOPHILIZED, FOR SOLUTION INTRAVENOUS at 15:11

## 2017-10-17 RX ADMIN — FLUPHENAZINE HYDROCHLORIDE 5 MILLIGRAM(S): 1 TABLET, FILM COATED ORAL at 17:16

## 2017-10-17 NOTE — PROGRESS NOTE ADULT - SUBJECTIVE AND OBJECTIVE BOX
CC: Weakness    INTERVAL HPI/OVERNIGHT EVENTS: C/O SOB sitting in chair. Denies chest pain, dizziness, lightheadedness, nausea, vomiting, fever, chills    Vital Signs Last 24 Hrs  T(C): 36.8 (17 Oct 2017 07:40), Max: 36.9 (16 Oct 2017 23:52)  T(F): 98.2 (17 Oct 2017 07:40), Max: 98.4 (16 Oct 2017 23:52)  HR: 58 (17 Oct 2017 07:40) (58 - 86)  BP: 122/60 (17 Oct 2017 07:40) (110/69 - 157/78)  BP(mean): --  RR: 20 (17 Oct 2017 07:40) (18 - 20)  SpO2: 99% (17 Oct 2017 07:40) (98% - 100%)  I&O's Detail    16 Oct 2017 07:01  -  17 Oct 2017 07:00  --------------------------------------------------------  IN:  Total IN: 0 mL    OUT:    Voided: 365 mL  Total OUT: 365 mL    Total NET: -365 mL      17 Oct 2017 07:01  -  17 Oct 2017 12:07  --------------------------------------------------------  IN:    Oral Fluid: 550 mL  Total IN: 550 mL    OUT:  Total OUT: 0 mL    Total NET: 550 mL        PHYSICAL EXAM:    GENERAL: NAD,AOX2  CHEST/LUNG: Bilateral base crackles, no wheeze  HEART: Regular rate and rhythm; No murmurs, rubs, or gallops  ABDOMEN: Soft, Nontender, Nondistended; Bowel sounds present  EXTREMITIES:  2+ Peripheral Pulses, No clubbing, cyanosis, or edema                            9.2    11.5  )-----------( 231      ( 17 Oct 2017 08:11 )             29.4     17 Oct 2017 08:11    137    |  102    |  23.0   ----------------------------<  207    5.2     |  25.0   |  0.77     Ca    8.1        17 Oct 2017 08:11    TPro  5.5    /  Alb  2.7    /  TBili  0.3    /  DBili  x      /  AST  9      /  ALT  8      /  AlkPhos  91     17 Oct 2017 08:11      CAPILLARY BLOOD GLUCOSE      POCT Blood Glucose.: 263 mg/dL (17 Oct 2017 11:30)  POCT Blood Glucose.: 168 mg/dL (16 Oct 2017 21:06)  POCT Blood Glucose.: 244 mg/dL (16 Oct 2017 17:24)  POCT Blood Glucose.: 264 mg/dL (16 Oct 2017 12:56)    LIVER FUNCTIONS - ( 17 Oct 2017 08:11 )  Alb: 2.7 g/dL / Pro: 5.5 g/dL / ALK PHOS: 91 U/L / ALT: 8 U/L / AST: 9 U/L / GGT: x             Hemoglobin A1C, Whole Blood: 6.8 % (10-15-17 @ 06:51)    MEDICATIONS  (STANDING):  aspirin 325 milliGRAM(s) Oral daily  carvedilol 3.125 milliGRAM(s) Oral every 12 hours  dextrose 5%. 1000 milliLiter(s) (50 mL/Hr) IV Continuous <Continuous>  dextrose 50% Injectable 12.5 Gram(s) IV Push once  dextrose 50% Injectable 25 Gram(s) IV Push once  dextrose 50% Injectable 25 Gram(s) IV Push once  ferrous    sulfate 325 milliGRAM(s) Oral daily  fluPHENAZine 5 milliGRAM(s) Oral every 12 hours  heparin  Injectable 5000 Unit(s) SubCutaneous every 8 hours  influenza   Vaccine 0.5 milliLiter(s) IntraMuscular once  insulin lispro (HumaLOG) corrective regimen sliding scale   SubCutaneous three times a day before meals  levothyroxine 88 MICROGram(s) Oral daily    MEDICATIONS  (PRN):  ALBUTerol/ipratropium for Nebulization 3 milliLiter(s) Nebulizer every 6 hours PRN Shortness of Breath and/or Wheezing  dextrose Gel 1 Dose(s) Oral once PRN Blood Glucose LESS THAN 70 milliGRAM(s)/deciliter  glucagon  Injectable 1 milliGRAM(s) IntraMuscular once PRN Glucose LESS THAN 70 milligrams/deciliter  sodium chloride 0.65% Nasal 1 Spray(s) Both Nostrils four times a day PRN Nasal Congestion      RADIOLOGY & ADDITIONAL TESTS:

## 2017-10-17 NOTE — DIETITIAN INITIAL EVALUATION ADULT. - OTHER INFO
Pt admitted for hyponatremia, AKR. Pt tolerating puree, nectar diet + 1500ml FR, fair intake, needs feeding assistance. Plan for MBS today. Pt poor historian, no family present.

## 2017-10-17 NOTE — PROGRESS NOTE ADULT - ASSESSMENT
The patient is a 77 year old female with a history of CHF, diabetes mellitus type 2 and schizophrenia who was admitted for chest pain, hyponatremia and acute renal failure. Treated in the ICU with iv fluids with improvement.  Was evaluated by psychiatry, possible SIADH, started on NaCl tabs, and buspar was discontinued. Renal following, normal renal ultrasound.     Assessment/Plan:    1. CARY: improved with iv hydration. Monitor bmp.  ACEI held, fluids discontinued, given IV Lasix today, monitor BMP in am    2. Hyponatremia: Resolved, NaCl tabs and fluid restriction were discontinue by nephrology. Fluids were discontinued. Monitor I&Os, Romeo out, patient voiding    3. Iron deficiency anemia: On po ferrous sulfate, possible hematoma noted in left groin on ultrasound with no palpable swelling or tenderness on exam.- Monitor cbc for now    4. Hypothyroidism Dose of synthroid was increased.     5. Schizophrenia: On Prolixin, buspar was discontinued     6. Chronic systolic/diastolic chf:  acute exacerbation after receiving IV fluids. BNP elevated. Give Lasix 20 mg IV today, re evaluate in am. Resume ACE after diuresis     7.  Diabetes mellitus type 2; Start humalog sliding scale for hyperglycemia, monitor bsl    8. Dysphagia: Modified barium swallow completed. pureed diet with Nectar thick liquids    VTE_ heparin subcut

## 2017-10-18 VITALS
OXYGEN SATURATION: 92 % | RESPIRATION RATE: 18 BRPM | SYSTOLIC BLOOD PRESSURE: 119 MMHG | TEMPERATURE: 98 F | DIASTOLIC BLOOD PRESSURE: 72 MMHG | HEART RATE: 66 BPM

## 2017-10-18 DIAGNOSIS — J18.9 PNEUMONIA, UNSPECIFIED ORGANISM: ICD-10-CM

## 2017-10-18 DIAGNOSIS — Z95.2 PRESENCE OF PROSTHETIC HEART VALVE: ICD-10-CM

## 2017-10-18 DIAGNOSIS — R07.89 OTHER CHEST PAIN: ICD-10-CM

## 2017-10-18 LAB
ALBUMIN SERPL ELPH-MCNC: 2.7 G/DL — LOW (ref 3.3–5.2)
ALP SERPL-CCNC: 91 U/L — SIGNIFICANT CHANGE UP (ref 40–120)
ALT FLD-CCNC: <4 U/L — SIGNIFICANT CHANGE UP
ANION GAP SERPL CALC-SCNC: 13 MMOL/L — SIGNIFICANT CHANGE UP (ref 5–17)
AST SERPL-CCNC: 9 U/L — SIGNIFICANT CHANGE UP
BILIRUB SERPL-MCNC: 0.3 MG/DL — LOW (ref 0.4–2)
BUN SERPL-MCNC: 17 MG/DL — SIGNIFICANT CHANGE UP (ref 8–20)
CALCIUM SERPL-MCNC: 8.2 MG/DL — LOW (ref 8.6–10.2)
CHLORIDE SERPL-SCNC: 96 MMOL/L — LOW (ref 98–107)
CK SERPL-CCNC: 12 U/L — LOW (ref 25–170)
CO2 SERPL-SCNC: 26 MMOL/L — SIGNIFICANT CHANGE UP (ref 22–29)
CREAT SERPL-MCNC: 0.75 MG/DL — SIGNIFICANT CHANGE UP (ref 0.5–1.3)
GLUCOSE BLDC GLUCOMTR-MCNC: 213 MG/DL — HIGH (ref 70–99)
GLUCOSE BLDC GLUCOMTR-MCNC: 225 MG/DL — HIGH (ref 70–99)
GLUCOSE SERPL-MCNC: 213 MG/DL — HIGH (ref 70–115)
MAGNESIUM SERPL-MCNC: 1.4 MG/DL — LOW (ref 1.8–2.6)
PHOSPHATE SERPL-MCNC: 2.4 MG/DL — SIGNIFICANT CHANGE UP (ref 2.4–4.7)
POTASSIUM SERPL-MCNC: 4.6 MMOL/L — SIGNIFICANT CHANGE UP (ref 3.5–5.3)
POTASSIUM SERPL-SCNC: 4.6 MMOL/L — SIGNIFICANT CHANGE UP (ref 3.5–5.3)
PROT SERPL-MCNC: 5.6 G/DL — LOW (ref 6.6–8.7)
SODIUM SERPL-SCNC: 135 MMOL/L — SIGNIFICANT CHANGE UP (ref 135–145)
TROPONIN T SERPL-MCNC: 0.07 NG/ML — HIGH (ref 0–0.06)

## 2017-10-18 PROCEDURE — 92610 EVALUATE SWALLOWING FUNCTION: CPT

## 2017-10-18 PROCEDURE — 82962 GLUCOSE BLOOD TEST: CPT

## 2017-10-18 PROCEDURE — 92611 MOTION FLUOROSCOPY/SWALLOW: CPT

## 2017-10-18 PROCEDURE — 82009 KETONE BODYS QUAL: CPT

## 2017-10-18 PROCEDURE — 92526 ORAL FUNCTION THERAPY: CPT

## 2017-10-18 PROCEDURE — 84550 ASSAY OF BLOOD/URIC ACID: CPT

## 2017-10-18 PROCEDURE — 94640 AIRWAY INHALATION TREATMENT: CPT

## 2017-10-18 PROCEDURE — 84436 ASSAY OF TOTAL THYROXINE: CPT

## 2017-10-18 PROCEDURE — 85027 COMPLETE CBC AUTOMATED: CPT

## 2017-10-18 PROCEDURE — 71045 X-RAY EXAM CHEST 1 VIEW: CPT

## 2017-10-18 PROCEDURE — 83880 ASSAY OF NATRIURETIC PEPTIDE: CPT

## 2017-10-18 PROCEDURE — 36600 WITHDRAWAL OF ARTERIAL BLOOD: CPT

## 2017-10-18 PROCEDURE — 82533 TOTAL CORTISOL: CPT

## 2017-10-18 PROCEDURE — 74230 X-RAY XM SWLNG FUNCJ C+: CPT

## 2017-10-18 PROCEDURE — 84443 ASSAY THYROID STIM HORMONE: CPT

## 2017-10-18 PROCEDURE — 83735 ASSAY OF MAGNESIUM: CPT

## 2017-10-18 PROCEDURE — 99239 HOSP IP/OBS DSCHRG MGMT >30: CPT

## 2017-10-18 PROCEDURE — 80053 COMPREHEN METABOLIC PANEL: CPT

## 2017-10-18 PROCEDURE — 76775 US EXAM ABDO BACK WALL LIM: CPT

## 2017-10-18 PROCEDURE — 93010 ELECTROCARDIOGRAM REPORT: CPT

## 2017-10-18 PROCEDURE — 81001 URINALYSIS AUTO W/SCOPE: CPT

## 2017-10-18 PROCEDURE — 82550 ASSAY OF CK (CPK): CPT

## 2017-10-18 PROCEDURE — 93970 EXTREMITY STUDY: CPT

## 2017-10-18 PROCEDURE — 93005 ELECTROCARDIOGRAM TRACING: CPT

## 2017-10-18 PROCEDURE — 97163 PT EVAL HIGH COMPLEX 45 MIN: CPT

## 2017-10-18 PROCEDURE — 99285 EMERGENCY DEPT VISIT HI MDM: CPT | Mod: 25

## 2017-10-18 PROCEDURE — 84466 ASSAY OF TRANSFERRIN: CPT

## 2017-10-18 PROCEDURE — 83605 ASSAY OF LACTIC ACID: CPT

## 2017-10-18 PROCEDURE — 93306 TTE W/DOPPLER COMPLETE: CPT

## 2017-10-18 PROCEDURE — 82728 ASSAY OF FERRITIN: CPT

## 2017-10-18 PROCEDURE — 83935 ASSAY OF URINE OSMOLALITY: CPT

## 2017-10-18 PROCEDURE — 84480 ASSAY TRIIODOTHYRONINE (T3): CPT

## 2017-10-18 PROCEDURE — 80048 BASIC METABOLIC PNL TOTAL CA: CPT

## 2017-10-18 PROCEDURE — 83036 HEMOGLOBIN GLYCOSYLATED A1C: CPT

## 2017-10-18 PROCEDURE — 36415 COLL VENOUS BLD VENIPUNCTURE: CPT

## 2017-10-18 PROCEDURE — 84300 ASSAY OF URINE SODIUM: CPT

## 2017-10-18 PROCEDURE — 83550 IRON BINDING TEST: CPT

## 2017-10-18 PROCEDURE — 99223 1ST HOSP IP/OBS HIGH 75: CPT

## 2017-10-18 PROCEDURE — 84484 ASSAY OF TROPONIN QUANT: CPT

## 2017-10-18 PROCEDURE — 70450 CT HEAD/BRAIN W/O DYE: CPT

## 2017-10-18 PROCEDURE — 84100 ASSAY OF PHOSPHORUS: CPT

## 2017-10-18 RX ORDER — FUROSEMIDE 40 MG
20 TABLET ORAL ONCE
Qty: 0 | Refills: 0 | Status: COMPLETED | OUTPATIENT
Start: 2017-10-18 | End: 2017-10-18

## 2017-10-18 RX ORDER — MAGNESIUM SULFATE 500 MG/ML
2 VIAL (ML) INJECTION ONCE
Qty: 0 | Refills: 0 | Status: COMPLETED | OUTPATIENT
Start: 2017-10-18 | End: 2017-10-18

## 2017-10-18 RX ORDER — NITROGLYCERIN 6.5 MG
0.3 CAPSULE, EXTENDED RELEASE ORAL ONCE
Qty: 0 | Refills: 0 | Status: COMPLETED | OUTPATIENT
Start: 2017-10-18 | End: 2017-10-18

## 2017-10-18 RX ORDER — SACCHAROMYCES BOULARDII 250 MG
1 POWDER IN PACKET (EA) ORAL
Qty: 0 | Refills: 0 | COMMUNITY
Start: 2017-10-18

## 2017-10-18 RX ORDER — SACCHAROMYCES BOULARDII 250 MG
250 POWDER IN PACKET (EA) ORAL
Qty: 0 | Refills: 0 | Status: DISCONTINUED | OUTPATIENT
Start: 2017-10-18 | End: 2017-10-18

## 2017-10-18 RX ADMIN — Medication 1 SPRAY(S): at 05:24

## 2017-10-18 RX ADMIN — Medication 325 MILLIGRAM(S): at 12:13

## 2017-10-18 RX ADMIN — Medication 20 MILLIGRAM(S): at 09:41

## 2017-10-18 RX ADMIN — Medication 2: at 12:11

## 2017-10-18 RX ADMIN — Medication 0.3 MILLIGRAM(S): at 09:32

## 2017-10-18 RX ADMIN — Medication 2: at 08:32

## 2017-10-18 RX ADMIN — CARVEDILOL PHOSPHATE 3.12 MILLIGRAM(S): 80 CAPSULE, EXTENDED RELEASE ORAL at 05:18

## 2017-10-18 RX ADMIN — Medication 3 MILLILITER(S): at 08:58

## 2017-10-18 RX ADMIN — Medication 88 MICROGRAM(S): at 05:18

## 2017-10-18 RX ADMIN — HEPARIN SODIUM 5000 UNIT(S): 5000 INJECTION INTRAVENOUS; SUBCUTANEOUS at 05:18

## 2017-10-18 RX ADMIN — Medication 50 GRAM(S): at 09:52

## 2017-10-18 RX ADMIN — PIPERACILLIN AND TAZOBACTAM 25 GRAM(S): 4; .5 INJECTION, POWDER, LYOPHILIZED, FOR SOLUTION INTRAVENOUS at 08:34

## 2017-10-18 RX ADMIN — FLUPHENAZINE HYDROCHLORIDE 5 MILLIGRAM(S): 1 TABLET, FILM COATED ORAL at 05:18

## 2017-10-18 NOTE — PROGRESS NOTE ADULT - PROVIDER SPECIALTY LIST ADULT
Critical Care
Hospitalist
Nephrology
Hospitalist
Nephrology
Nephrology
Hospitalist

## 2017-10-18 NOTE — CONSULT NOTE ADULT - ASSESSMENT
79 y/o F with schizoaffective, hypothyroid, diastolic heart failure, COPD, s/p TAVR within the past month at [facility] presented with chest pain.  Treated for hyponatremia , renal failure, hyperkalemia, and pneumonia. Echocardiogram performed last week demonstrated severe global hypokinesis.  Cardiology has been consulted to due patient's complaints of chest pain.  She is a poor historian, but reports constant midsternal chest pressure - moderate to severe in intensity since her TAVR.  Non-exertional.  Cannot identify any provocators or alleviators.  No associated nausea, diaphoresis, radiation.  Troponins mildly elevated but CKs are normal. ECG with anteroseptal T wave inversion.      1. chest pain- atypical, in setting of normal CK, very low likelihood this is ACS.  ?Pericarditis. Consider high dose aspirin therapy. BB.   2. aortic valve disease s/p TAVR- stable.  no significant AI, normally functioning prosthetic valve.   3. systolic heart failure - ? unknown baseline, prior hx of HFPEF.  Euvolemic. Continue BB.  Renal function has normalized, consider ACEi or ARB.    4. abnormal ECG - initial ECG on presentation consistent with hyperkalemia.  ECG today shows anteroseptal TWI (unknown baseline).  As above, very low likelihood this is ACS. Continue aspirin/bb.    Prior cardiac workup/TAVR at [place], where she is about to be transferred.   D/W medicine team.

## 2017-10-18 NOTE — PROGRESS NOTE ADULT - ATTENDING COMMENTS
D/C  fluid restriction and po salt tabs, monitor labs in am.
Patient to be transferred to [facility] this afternoon
Pt improving, will have psych to weigh in on meds, nephro following. Pt no longer requires ICU LOC and may transfer to floor.

## 2017-10-18 NOTE — PROGRESS NOTE ADULT - ASSESSMENT
The patient is a 77 year old female with a history of CHF, diabetes mellitus type 2 and schizophrenia who was admitted for chest pain, hyponatremia and acute renal failure. Treated in the ICU with iv fluids with improvement.  Was evaluated by psychiatry, possible SIADH, started on NaCl tabs, and buspar was discontinued. Renal following, normal renal ultrasound.     Assessment/Plan:    1. Hospital Acquired PNA- started on Zosyn on 10/17    2. Chest pain- EKG with T wave inversion V1-V3, nitro responsive, Trop 0.07, trended down from admission, hemodynamically stable, transfer to [place] today    3. CARY: improved with iv hydration. Monitor bmp.  ACEI held, fluids discontinued, given IV Lasix today, monitor BMP in am    4.  Hyponatremia: Resolved, NaCl tabs and fluid restriction were discontinue by nephrology. Fluids were discontinued. Monitor I&Os, Romeo out, patient voiding    5. Iron deficiency anemia: On po ferrous sulfate, possible hematoma noted in left groin on ultrasound with no palpable swelling or tenderness on exam.- Monitor cbc for now    6 Hypothyroidism Dose of synthroid was increased.     7. Schizophrenia: On Prolixin, buspar was discontinued     8. Chronic systolic/diastolic chf:  acute exacerbation after receiving IV fluids. BNP elevated. Give Lasix 20 mg IV today, re evaluate in am. Resume ACE after diuresis     9.  Diabetes mellitus type 2;  humalog sliding scale for hyperglycemia, monitor bsl    10. Dysphagia: Modified barium swallow completed. pureed diet with Nectar thick liquids    VTE_ heparin subcut

## 2017-10-18 NOTE — PHYSICAL THERAPY INITIAL EVALUATION ADULT - ADDITIONAL COMMENTS
Pt is a poor historian reports living in a facility, unsure of type and name, and reports amb with RW independently.

## 2017-10-18 NOTE — PROGRESS NOTE ADULT - SUBJECTIVE AND OBJECTIVE BOX
CC: chest pain   hyponatermia       INTERVAL HPI/OVERNIGHT EVENTS: C/O chest pressure this am with SOB. Denies dizziness, lightheadedness, nausea. Relieved after SL nitro and IV LAsix.     Vital Signs Last 24 Hrs  T(C): 36.6 (18 Oct 2017 07:55), Max: 36.7 (18 Oct 2017 01:32)  T(F): 97.8 (18 Oct 2017 07:55), Max: 98 (18 Oct 2017 01:32)  HR: 72 (18 Oct 2017 07:55) (69 - 77)  BP: 146/70 (18 Oct 2017 07:55) (125/71 - 146/70)  BP(mean): --  RR: 18 (18 Oct 2017 07:55) (18 - 18)  SpO2: 99% (18 Oct 2017 07:55) (96% - 100%)  I&O's Detail    17 Oct 2017 07:01  -  18 Oct 2017 07:00  --------------------------------------------------------  IN:    Oral Fluid: 550 mL  Total IN: 550 mL    OUT:  Total OUT: 0 mL    Total NET: 550 mL      18 Oct 2017 07:01  -  18 Oct 2017 11:36  --------------------------------------------------------  IN:  Total IN: 0 mL    OUT:    Voided: 350 mL  Total OUT: 350 mL    Total NET: -350 mL      PHYSICAL EXAM:    GENERAL: NAD,AOX2  CHEST/LUNG: CTA B/L  HEART: Regular rate and rhythm; No murmurs, rubs, or gallops  ABDOMEN: Soft, Nontender, Nondistended; Bowel sounds present  EXTREMITIES:  2+ Peripheral Pulses, No clubbing, cyanosis, or edema      CARDIAC MARKERS ( 18 Oct 2017 07:59 )  x     / 0.07 ng/mL / 12 U/L / x     / x                                9.2    11.5  )-----------( 231      ( 17 Oct 2017 08:11 )             29.4     18 Oct 2017 07:59    135    |  96     |  17.0   ----------------------------<  213    4.6     |  26.0   |  0.75     Ca    8.2        18 Oct 2017 07:59  Phos  2.4       18 Oct 2017 07:59  Mg     1.4       18 Oct 2017 07:59    TPro  5.6    /  Alb  2.7    /  TBili  0.3    /  DBili  x      /  AST  9      /  ALT  <4     /  AlkPhos  91     18 Oct 2017 07:59      CAPILLARY BLOOD GLUCOSE      POCT Blood Glucose.: 213 mg/dL (18 Oct 2017 08:31)  POCT Blood Glucose.: 198 mg/dL (17 Oct 2017 21:16)  POCT Blood Glucose.: 150 mg/dL (17 Oct 2017 16:40)    LIVER FUNCTIONS - ( 18 Oct 2017 07:59 )  Alb: 2.7 g/dL / Pro: 5.6 g/dL / ALK PHOS: 91 U/L / ALT: <4 U/L / AST: 9 U/L / GGT: x             Hemoglobin A1C, Whole Blood: 6.8 % (10-15-17 @ 06:51)    MEDICATIONS  (STANDING):  aspirin 325 milliGRAM(s) Oral daily  carvedilol 3.125 milliGRAM(s) Oral every 12 hours  dextrose 5%. 1000 milliLiter(s) (50 mL/Hr) IV Continuous <Continuous>  dextrose 50% Injectable 12.5 Gram(s) IV Push once  dextrose 50% Injectable 25 Gram(s) IV Push once  dextrose 50% Injectable 25 Gram(s) IV Push once  ferrous    sulfate 325 milliGRAM(s) Oral daily  fluPHENAZine 5 milliGRAM(s) Oral every 12 hours  heparin  Injectable 5000 Unit(s) SubCutaneous every 8 hours  influenza   Vaccine 0.5 milliLiter(s) IntraMuscular once  insulin lispro (HumaLOG) corrective regimen sliding scale   SubCutaneous three times a day before meals  levothyroxine 88 MICROGram(s) Oral daily  piperacillin/tazobactam IVPB. 3.375 Gram(s) IV Intermittent every 8 hours  saccharomyces boulardii 250 milliGRAM(s) Oral two times a day    MEDICATIONS  (PRN):  ALBUTerol/ipratropium for Nebulization 3 milliLiter(s) Nebulizer every 6 hours PRN Shortness of Breath and/or Wheezing  dextrose Gel 1 Dose(s) Oral once PRN Blood Glucose LESS THAN 70 milliGRAM(s)/deciliter  glucagon  Injectable 1 milliGRAM(s) IntraMuscular once PRN Glucose LESS THAN 70 milligrams/deciliter  sodium chloride 0.65% Nasal 1 Spray(s) Both Nostrils four times a day PRN Nasal Congestion      RADIOLOGY & ADDITIONAL TESTS:

## 2017-10-18 NOTE — CONSULT NOTE ADULT - SUBJECTIVE AND OBJECTIVE BOX
[facility]                                                        Office: [address] [place] [address]                                                       Telephone: [phone]. Fax:[phone]    Patient is a 77y old  Female who presents with a chief complaint of chest pain   hyponatermia (16 Oct 2017 11:14)      HPI: The patient is a 77 year female presents to ED complaining of chest pain since last night. Described as pressure with SOB and radiation to L arm. 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 1000 milliLiter(s) (50 mL/Hr) IV Continuous <Continuous>  dextrose 50% Injectable 12.5 Gram(s) IV Push once  dextrose 50% Injectable 25 Gram(s) IV Push once  dextrose 50% Injectable 25 Gram(s) IV Push once  ferrous    sulfate 325 milliGRAM(s) Oral daily  fluPHENAZine 5 milliGRAM(s) Oral every 12 hours  heparin  Injectable 5000 Unit(s) SubCutaneous every 8 hours  influenza   Vaccine 0.5 milliLiter(s) IntraMuscular once  insulin lispro (HumaLOG) corrective regimen sliding scale   SubCutaneous three times a day before meals  levothyroxine 88 MICROGram(s) Oral daily  piperacillin/tazobactam IVPB. 3.375 Gram(s) IV Intermittent every 8 hours  saccharomyces boulardii 250 milliGRAM(s) Oral two times a day    MEDICATIONS  (PRN):  ALBUTerol/ipratropium for Nebulization 3 milliLiter(s) Nebulizer every 6 hours PRN Shortness of Breath and/or Wheezing  dextrose Gel 1 Dose(s) Oral once PRN Blood Glucose LESS THAN 70 milliGRAM(s)/deciliter  glucagon  Injectable 1 milliGRAM(s) IntraMuscular once PRN Glucose LESS THAN 70 milligrams/deciliter  sodium chloride 0.65% Nasal 1 Spray(s) Both Nostrils four times a day PRN Nasal Congestion      FAMILY HISTORY:      SOCIAL HISTORY: Denies tobacco/etoh/illicit drug use    PREVIOUS DIAGNOSTIC TESTING:      ECHO FINDINGS:  STRESS FINDINGS:  CATHETERIZATION FINDINGS:    REVIEW OF SYSTEMS:  CONSTITUTIONAL: [-]fever   [-] weight loss   [-] fatigue  EYES: [-]  eye pain   [-] visual disturbances      [-]  discharge  ENMT:  [-]  difficulty hearing,   [-]  tinnitus   [-] vertigo    [-]  sinus or throat pain  NECK: [-]  pain or stiffness  RESPIRATORY: [-]  cough 	[-] wheezing 	[-]  hemoptysis 		[-]  No Shortness of Breath  CARDIOVASCULAR: [-]  chest pain	[-] palpitations		[-]  passing out 		[-] dizziness 	[-]  leg swelling  		[-] No PND 	[-] orthopnea  GASTROINTESTINAL: [-]  abdominal pain		[-]nausea	[-] vomiting	[-]  hematemesis 	[-]  diarrhea  	[-] constipation 		[-]  melena 	[-] hematochezia.  GENITOURINARY: [-] dysuria	[-] frequency	[-] hematuria	[-]  incontinence  NEUROLOGICAL: [-]  headaches		[-] memory loss 	[-]  loss of strength  			[-]  numbness/tingling 	[-]  tremors  SKIN: [-]  itching 	[-] rashes 	[-]  lesions   LYMPH Nodes: [-] enlarged glands  ENDOCRINE: [-] heat or cold intolerance 	[-]  No hair loss  MUSCULOSKELETAL: [-] joint pain  [-] joint swelling	[-]  muscle, back, or extremity pain  PSYCHIATRIC: [-]  depression	[-] anxiety	[-] mood swings		[-]  difficulty sleeping   HEME: [-]  easy bruising 	[-]  bleeding   ALLERY AND IMMUNOLOGIC: [-]  hives or eczema	      Vital Signs Last 24 Hrs  T(C): 36.6 (18 Oct 2017 07:55), Max: 36.7 (18 Oct 2017 01:32)  T(F): 97.8 (18 Oct 2017 07:55), Max: 98 (18 Oct 2017 01:32)  HR: 72 (18 Oct 2017 07:55) (69 - 77)  BP: 146/70 (18 Oct 2017 07:55) (125/71 - 146/70)  BP(mean): --  RR: 18 (18 Oct 2017 07:55) (18 - 18)  SpO2: 99% (18 Oct 2017 07:55) (96% - 100%)  Daily     Daily Weight in k.5 (17 Oct 2017 13:26)  I&O's Detail    17 Oct 2017 07:  -  18 Oct 2017 07:00  --------------------------------------------------------  IN:    Oral Fluid: 550 mL  Total IN: 550 mL    OUT:  Total OUT: 0 mL    Total NET: 550 mL      18 Oct 2017 07:01  -  18 Oct 2017 12:11  --------------------------------------------------------  IN:  Total IN: 0 mL    OUT:    Voided: 350 mL  Total OUT: 350 mL    Total NET: -350 mL          PHYSICAL EXAM:  Appearance: Normal, well nourished, NAD	  HEENT:   Normal oral mucosa, PERRL, EOMI, sclera non-icteric	  Lymphatic: No cervical lymphadenopathy  Cardiovascular: Normal S1 S2, No JVD, No cardiac murmurs, No carotid bruits, No peripheral edema  Respiratory: Lungs clear to auscultation	  Psychiatry: A & O x 3, Mood & affect appropriate  Gastrointestinal:  Soft, Non-tender, + BS, no bruits	  Skin: No rashes, No ecchymoses, No cyanosis, Dry  Neurologic: Grossly non-focal with full strength in all four extremities  Extremities: Normal range of motion, No clubbing, cyanosis or edema  Vascular: Peripheral pulses palpable 2+ bilaterally, warm    INTERPRETATION OF TELEMETRY:n/a    ECG (tracing reviewed by me):   10/18/17 SR 72 bpm, anteroseptal TWI  10/11/17 SR 89 bpm, LBBB (in setting of hyperkalemia)    LABS:                        9.2    11.5  )-----------( 231      ( 17 Oct 2017 08:11 )             29.4     10-18    135  |  96<L>  |  17.0  ----------------------------<  213<H>  4.6   |  26.0  |  0.75    Ca    8.2<L>      18 Oct 2017 07:59  Phos  2.4     10-18  Mg     1.4     10-18    TPro  5.6<L>  /  Alb  2.7<L>  /  TBili  0.3<L>  /  DBili  x   /  AST  9   /  ALT  <4  /  AlkPhos  91  10-18    CARDIAC MARKERS ( 18 Oct 2017 07:59 )  x     / 0.07 ng/mL / 12 U/L / x     / x              BNP  RADIOLOGY & ADDITIONAL STUDIES:  CXR (image reviewed by me): [facility]                                                        Office: [address] [place] [address]                                                       Telephone: [phone]. Fax:[phone]    Patient is a 77y old  Female who presents with a chief complaint of chest pain   hyponatermia (16 Oct 2017 11:14)    HPI:  77 y/o F with schizoaffective, hypothyroid, diastolic heart failure, COPD, s/p TAVR within the past month at [facility] presented with chest pain.  Treated for hyponatremia , renal failure, hyperkalemia, and pneumonia. Echocardiogram performed last week demonstrated severe global hypokinesis.  Cardiology has been consulted to due patient's complaints of chest pain.  She is a poor historian, but reports constant midsternal chest pressure - moderate to severe in intensity since her TAVR.  Non-exertional.  Cannot identify any provocators or alleviators.  No associated nausea, diaphoresis, radiation.  Troponins mildly elevated but CKs are normal.      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 1000 milliLiter(s) (50 mL/Hr) IV Continuous <Continuous>  dextrose 50% Injectable 12.5 Gram(s) IV Push once  dextrose 50% Injectable 25 Gram(s) IV Push once  dextrose 50% Injectable 25 Gram(s) IV Push once  ferrous    sulfate 325 milliGRAM(s) Oral daily  fluPHENAZine 5 milliGRAM(s) Oral every 12 hours  heparin  Injectable 5000 Unit(s) SubCutaneous every 8 hours  influenza   Vaccine 0.5 milliLiter(s) IntraMuscular once  insulin lispro (HumaLOG) corrective regimen sliding scale   SubCutaneous three times a day before meals  levothyroxine 88 MICROGram(s) Oral daily  piperacillin/tazobactam IVPB. 3.375 Gram(s) IV Intermittent every 8 hours  saccharomyces boulardii 250 milliGRAM(s) Oral two times a day    MEDICATIONS  (PRN):  ALBUTerol/ipratropium for Nebulization 3 milliLiter(s) Nebulizer every 6 hours PRN Shortness of Breath and/or Wheezing  dextrose Gel 1 Dose(s) Oral once PRN Blood Glucose LESS THAN 70 milliGRAM(s)/deciliter  glucagon  Injectable 1 milliGRAM(s) IntraMuscular once PRN Glucose LESS THAN 70 milligrams/deciliter  sodium chloride 0.65% Nasal 1 Spray(s) Both Nostrils four times a day PRN Nasal Congestion    FAMILY HISTORY: non-contributory    SOCIAL HISTORY: Denies tobacco/etoh/illicit drug use    PREVIOUS DIAGNOSTIC TESTING:  NONE IN OUR SYSTEM    REVIEW OF SYSTEMS:  CONSTITUTIONAL: [-]fever   [-] weight loss   [-] fatigue  EYES: [-]  eye pain   [-] visual disturbances      [-]  discharge  ENMT:  [-]  difficulty hearing,   [-]  tinnitus   [-] vertigo    [-]  sinus or throat pain  NECK: [-]  pain or stiffness  RESPIRATORY: [-]  cough 	[-] wheezing 	[-]  hemoptysis 		[-]  No Shortness of Breath  CARDIOVASCULAR: [+]  chest pain	[-] palpitations		[-]  passing out 		[-] dizziness 	[-]  leg swelling  		[-] No PND 	[-] orthopnea  GASTROINTESTINAL: [-]  abdominal pain		[-]nausea	[-] vomiting	[-]  hematemesis 	[-]  diarrhea  	[-] constipation 		[-]  melena 	[-] hematochezia.  GENITOURINARY: [-] dysuria	[-] frequency	[-] hematuria	[-]  incontinence  NEUROLOGICAL: [-]  headaches		[-] memory loss 	[-]  loss of strength  			[-]  numbness/tingling 	[-]  tremors  SKIN: [-]  itching 	[-] rashes 	[-]  lesions   LYMPH Nodes: [-] enlarged glands  ENDOCRINE: [-] heat or cold intolerance 	[-]  No hair loss  MUSCULOSKELETAL: [-] joint pain  [-] joint swelling	[-]  muscle, back, or extremity pain  PSYCHIATRIC: [-]  depression	[-] anxiety	[-] mood swings		[-]  difficulty sleeping   HEME: [-]  easy bruising 	[-]  bleeding   ALLERY AND IMMUNOLOGIC: [-]  hives or eczema	      Vital Signs Last 24 Hrs  T(C): 36.6 (18 Oct 2017 07:55), Max: 36.7 (18 Oct 2017 01:32)  T(F): 97.8 (18 Oct 2017 07:55), Max: 98 (18 Oct 2017 01:32)  HR: 72 (18 Oct 2017 07:55) (69 - 77)  BP: 146/70 (18 Oct 2017 07:55) (125/71 - 146/70)  RR: 18 (18 Oct 2017 07:55) (18 - 18)  SpO2: 99% (18 Oct 2017 07:55) (96% - 100%)  Daily     Daily Weight in k.5 (17 Oct 2017 13:26)  I&O's Detail    17 Oct 2017 07:01  -  18 Oct 2017 07:00  --------------------------------------------------------  IN:    Oral Fluid: 550 mL  Total IN: 550 mL    OUT:  Total OUT: 0 mL    Total NET: 550 mL      18 Oct 2017 07:01  -  18 Oct 2017 12:11  --------------------------------------------------------  IN:  Total IN: 0 mL    OUT:    Voided: 350 mL  Total OUT: 350 mL    Total NET: -350 mL      PHYSICAL EXAM:  Appearance: Normal, well nourished, NAD	  HEENT:   Normal oral mucosa, PERRL, EOMI, sclera non-icteric	  Lymphatic: No cervical lymphadenopathy  Cardiovascular: Normal S1 S2, No JVD, No cardiac murmurs, No carotid bruits, No peripheral edema  Respiratory: Lungs clear to auscultation	  Psychiatry: A & O x 3, Mood & affect appropriate  Gastrointestinal:  Soft, Non-tender, + BS, no bruits	  Skin: No rashes, No ecchymoses, No cyanosis, Dry  Neurologic: Grossly non-focal with full strength in all four extremities  Extremities: Normal range of motion, No clubbing, cyanosis or edema  Vascular: Peripheral pulses palpable 2+ bilaterally, warm    INTERPRETATION OF TELEMETRY:n/a    ECG (tracing reviewed by me):   10/18/17 SR 72 bpm, anteroseptal TWI  10/11/17 SR 89 bpm, LBBB (in setting of hyperkalemia)    LABS:                        9.2    11.5  )-----------( 231      ( 17 Oct 2017 08:11 )             29.4     10-18    135  |  96<L>  |  17.0  ----------------------------<  213<H>  4.6   |  26.0  |  0.75    Ca    8.2<L>      18 Oct 2017 07:59  Phos  2.4     10-18  Mg     1.4     10-18    TPro  5.6<L>  /  Alb  2.7<L>  /  TBili  0.3<L>  /  DBili  x   /  AST  9   /  ALT  <4  /  AlkPhos  91  10-18    CARDIAC MARKERS ( 18 Oct 2017 07:59 )  x     / 0.07 ng/mL / 12 U/L / x     / x        RADIOLOGY & ADDITIONAL STUDIES:  CXR (image reviewed by me):   There is elevation of the left hemidiaphragm.  There is patchy infiltrate   in left lower lobe, no pleural effusion. Cardiac and mediastinal   structures are within normal limits.    There is osteopenia and   degenerative changes.       10/12/17 TTE   Summary:   1. Moderately enlarged left atrium.   2. Global diffuse akinesis. Left ventricular ejection fraction, by   visual estimation, is 30-35%.   3. Elevated left atrial and left ventricular end-diastolic pressures.   (LAP >30 mm Hg)   4. Moderately dilated right atrium.   5. The right ventricular size is normal. RV systolic function is low   normal.   6. Bioprosthesis in the aortic position. Likely [employer] Core CHARLIE.   Trivial paravalvular leak.   7. Mild mitral valve regurgitation.   8. Small pericardial effusion. No specific echo signs of tamponade. RA   wall inversion visualized. This sign may be sensitive for not specific   for tamponade. Suggest clinical correlation.

## 2017-10-18 NOTE — PHYSICAL THERAPY INITIAL EVALUATION ADULT - IMPAIRMENTS FOUND, PT EVAL
cognitive impairment/poor safety awareness/muscle strength/gross motor/gait, locomotion, and balance

## 2023-02-10 NOTE — BEHAVIORAL HEALTH ASSESSMENT NOTE - PAST PSYCHOTROPIC MEDICATION
Area H Indication Text: Tumors in this location are included in Area H (eyelids, eyebrows, nose, lips, chin, ear, pre-auricular, post-auricular, temple, genitalia, hands, feet, ankles and areola).  Tissue conservation is critical in these anatomic locations. Clozapine 125mg qhs  Paxile